# Patient Record
Sex: FEMALE | Race: WHITE | Employment: UNEMPLOYED | URBAN - METROPOLITAN AREA
[De-identification: names, ages, dates, MRNs, and addresses within clinical notes are randomized per-mention and may not be internally consistent; named-entity substitution may affect disease eponyms.]

---

## 2017-03-09 ENCOUNTER — OFFICE VISIT (OUTPATIENT)
Dept: ORTHOPEDIC SURGERY | Age: 37
End: 2017-03-09

## 2017-03-09 VITALS — DIASTOLIC BLOOD PRESSURE: 80 MMHG | SYSTOLIC BLOOD PRESSURE: 117 MMHG | WEIGHT: 218 LBS | HEART RATE: 60 BPM

## 2017-03-09 DIAGNOSIS — M23.200 OLD TEAR OF LATERAL MENISCUS OF RIGHT KNEE, UNSPECIFIED TEAR TYPE: ICD-10-CM

## 2017-03-09 DIAGNOSIS — M25.561 RIGHT KNEE PAIN, UNSPECIFIED CHRONICITY: Primary | ICD-10-CM

## 2017-03-09 PROCEDURE — 73564 X-RAY EXAM KNEE 4 OR MORE: CPT | Performed by: PHYSICIAN ASSISTANT

## 2017-03-09 PROCEDURE — 99203 OFFICE O/P NEW LOW 30 MIN: CPT | Performed by: PHYSICIAN ASSISTANT

## 2017-03-09 RX ORDER — IBUPROFEN 200 MG
200 TABLET ORAL EVERY 6 HOURS PRN
COMMUNITY
End: 2017-08-03

## 2017-03-09 RX ORDER — MELOXICAM 15 MG/1
15 TABLET ORAL DAILY
Qty: 30 TABLET | Refills: 0 | Status: SHIPPED | OUTPATIENT
Start: 2017-03-09 | End: 2017-08-03

## 2017-03-09 RX ORDER — NAPROXEN 250 MG/1
250 TABLET ORAL 2 TIMES DAILY WITH MEALS
COMMUNITY
End: 2017-04-28

## 2017-03-10 ENCOUNTER — TELEPHONE (OUTPATIENT)
Dept: ORTHOPEDIC SURGERY | Age: 37
End: 2017-03-10

## 2017-03-14 ENCOUNTER — HOSPITAL ENCOUNTER (OUTPATIENT)
Dept: MRI IMAGING | Age: 37
Discharge: HOME | End: 2017-03-14
Attending: PHYSICIAN ASSISTANT | Admitting: PHYSICIAN ASSISTANT

## 2017-03-14 DIAGNOSIS — M23.200 OLD TEAR OF LATERAL MENISCUS OF RIGHT KNEE, UNSPECIFIED TEAR TYPE: ICD-10-CM

## 2017-03-14 DIAGNOSIS — M25.561 RIGHT KNEE PAIN, UNSPECIFIED CHRONICITY: ICD-10-CM

## 2017-03-14 DIAGNOSIS — M25.561 PAIN IN RIGHT KNEE: ICD-10-CM

## 2017-04-20 ENCOUNTER — OFFICE VISIT (OUTPATIENT)
Dept: ORTHOPEDIC SURGERY | Age: 37
End: 2017-04-20

## 2017-04-20 DIAGNOSIS — M23.200 OLD TEAR OF LATERAL MENISCUS OF RIGHT KNEE, UNSPECIFIED TEAR TYPE: Primary | ICD-10-CM

## 2017-04-20 PROCEDURE — L1810 KO ELASTIC WITH JOINTS: HCPCS | Performed by: ORTHOPAEDIC SURGERY

## 2017-04-20 PROCEDURE — 99214 OFFICE O/P EST MOD 30 MIN: CPT | Performed by: ORTHOPAEDIC SURGERY

## 2017-04-26 ENCOUNTER — TELEPHONE (OUTPATIENT)
Dept: ORTHOPEDIC SURGERY | Age: 37
End: 2017-04-26

## 2017-05-12 ENCOUNTER — HOSPITAL ENCOUNTER (OUTPATIENT)
Dept: SURGERY | Age: 37
Discharge: HOME | End: 2017-05-12
Attending: ORTHOPAEDIC SURGERY | Admitting: ORTHOPAEDIC SURGERY

## 2017-05-12 VITALS
HEART RATE: 52 BPM | OXYGEN SATURATION: 98 % | HEIGHT: 71 IN | SYSTOLIC BLOOD PRESSURE: 110 MMHG | WEIGHT: 220 LBS | BODY MASS INDEX: 30.8 KG/M2 | TEMPERATURE: 97 F | DIASTOLIC BLOOD PRESSURE: 70 MMHG | RESPIRATION RATE: 15 BRPM

## 2017-05-12 RX ORDER — OXYCODONE HYDROCHLORIDE AND ACETAMINOPHEN 5; 325 MG/1; MG/1
1 TABLET ORAL PRN
Status: ACTIVE | OUTPATIENT
Start: 2017-05-12 | End: 2017-05-12

## 2017-05-12 RX ORDER — SODIUM CHLORIDE, SODIUM LACTATE, POTASSIUM CHLORIDE, CALCIUM CHLORIDE 600; 310; 30; 20 MG/100ML; MG/100ML; MG/100ML; MG/100ML
INJECTION, SOLUTION INTRAVENOUS CONTINUOUS
Status: DISCONTINUED | OUTPATIENT
Start: 2017-05-12 | End: 2017-05-13 | Stop reason: HOSPADM

## 2017-05-12 RX ORDER — LABETALOL HYDROCHLORIDE 5 MG/ML
5 INJECTION, SOLUTION INTRAVENOUS EVERY 10 MIN PRN
Status: DISCONTINUED | OUTPATIENT
Start: 2017-05-12 | End: 2017-05-13 | Stop reason: HOSPADM

## 2017-05-12 RX ORDER — ONDANSETRON 2 MG/ML
4 INJECTION INTRAMUSCULAR; INTRAVENOUS EVERY 10 MIN PRN
Status: DISCONTINUED | OUTPATIENT
Start: 2017-05-12 | End: 2017-05-13 | Stop reason: HOSPADM

## 2017-05-12 RX ORDER — HYDROCODONE BITARTRATE AND ACETAMINOPHEN 7.5; 325 MG/1; MG/1
1 TABLET ORAL EVERY 4 HOURS PRN
Qty: 40 TABLET | Refills: 0 | Status: SHIPPED | OUTPATIENT
Start: 2017-05-12 | End: 2017-05-19

## 2017-05-12 RX ORDER — OXYCODONE HYDROCHLORIDE AND ACETAMINOPHEN 5; 325 MG/1; MG/1
2 TABLET ORAL PRN
Status: ACTIVE | OUTPATIENT
Start: 2017-05-12 | End: 2017-05-12

## 2017-05-12 RX ORDER — HYDROMORPHONE HCL 110MG/55ML
0.5 PATIENT CONTROLLED ANALGESIA SYRINGE INTRAVENOUS EVERY 5 MIN PRN
Status: DISCONTINUED | OUTPATIENT
Start: 2017-05-12 | End: 2017-05-13 | Stop reason: HOSPADM

## 2017-05-12 RX ORDER — MEPERIDINE HYDROCHLORIDE 25 MG/ML
12.5 INJECTION INTRAMUSCULAR; INTRAVENOUS; SUBCUTANEOUS EVERY 5 MIN PRN
Status: DISCONTINUED | OUTPATIENT
Start: 2017-05-12 | End: 2017-05-13 | Stop reason: HOSPADM

## 2017-05-12 RX ORDER — MIDAZOLAM HYDROCHLORIDE 1 MG/ML
INJECTION INTRAMUSCULAR; INTRAVENOUS
Status: DISPENSED
Start: 2017-05-12 | End: 2017-05-12

## 2017-05-12 RX ORDER — HYDROMORPHONE HCL 110MG/55ML
0.25 PATIENT CONTROLLED ANALGESIA SYRINGE INTRAVENOUS EVERY 5 MIN PRN
Status: DISCONTINUED | OUTPATIENT
Start: 2017-05-12 | End: 2017-05-13 | Stop reason: HOSPADM

## 2017-05-12 RX ORDER — MIDAZOLAM HYDROCHLORIDE 1 MG/ML
2 INJECTION INTRAMUSCULAR; INTRAVENOUS ONCE
Status: COMPLETED | OUTPATIENT
Start: 2017-05-12 | End: 2017-05-12

## 2017-05-12 RX ORDER — LIDOCAINE HYDROCHLORIDE 10 MG/ML
1 INJECTION, SOLUTION EPIDURAL; INFILTRATION; INTRACAUDAL; PERINEURAL
Status: COMPLETED | OUTPATIENT
Start: 2017-05-12 | End: 2017-05-12

## 2017-05-12 RX ORDER — ACETAMINOPHEN 10 MG/ML
1000 INJECTION, SOLUTION INTRAVENOUS ONCE
Status: COMPLETED | OUTPATIENT
Start: 2017-05-12 | End: 2017-05-12

## 2017-05-12 RX ORDER — HYDRALAZINE HYDROCHLORIDE 20 MG/ML
5 INJECTION INTRAMUSCULAR; INTRAVENOUS EVERY 10 MIN PRN
Status: DISCONTINUED | OUTPATIENT
Start: 2017-05-12 | End: 2017-05-13 | Stop reason: HOSPADM

## 2017-05-12 RX ORDER — PROMETHAZINE HYDROCHLORIDE 25 MG/1
25 TABLET ORAL EVERY 6 HOURS PRN
Qty: 28 TABLET | Refills: 0 | Status: SHIPPED | OUTPATIENT
Start: 2017-05-12 | End: 2017-05-19

## 2017-05-12 RX ADMIN — MIDAZOLAM HYDROCHLORIDE 2 MG: 1 INJECTION INTRAMUSCULAR; INTRAVENOUS at 07:27

## 2017-05-12 RX ADMIN — LIDOCAINE HYDROCHLORIDE 1 ML: 10 INJECTION, SOLUTION EPIDURAL; INFILTRATION; INTRACAUDAL; PERINEURAL at 06:54

## 2017-05-12 RX ADMIN — Medication 0.5 MG: at 08:55

## 2017-05-12 RX ADMIN — SODIUM CHLORIDE, SODIUM LACTATE, POTASSIUM CHLORIDE, CALCIUM CHLORIDE: 600; 310; 30; 20 INJECTION, SOLUTION INTRAVENOUS at 06:55

## 2017-05-12 RX ADMIN — ACETAMINOPHEN 1000 MG: 10 INJECTION, SOLUTION INTRAVENOUS at 07:13

## 2017-05-12 RX ADMIN — Medication 0.5 MG: at 08:46

## 2017-05-12 ASSESSMENT — PAIN SCALES - GENERAL
PAINLEVEL_OUTOF10: 6
PAINLEVEL_OUTOF10: 7
PAINLEVEL_OUTOF10: 0
PAINLEVEL_OUTOF10: 4
PAINLEVEL_OUTOF10: 0
PAINLEVEL_OUTOF10: 7
PAINLEVEL_OUTOF10: 4

## 2017-05-12 ASSESSMENT — PAIN DESCRIPTION - DESCRIPTORS: DESCRIPTORS: ACHING

## 2017-05-12 ASSESSMENT — PAIN - FUNCTIONAL ASSESSMENT: PAIN_FUNCTIONAL_ASSESSMENT: 0-10

## 2017-05-12 ASSESSMENT — ACTIVITIES OF DAILY LIVING (ADL): EFFECT OF PAIN ON DAILY ACTIVITIES: WALKING AND STANDING

## 2017-05-18 ENCOUNTER — OFFICE VISIT (OUTPATIENT)
Dept: ORTHOPEDIC SURGERY | Age: 37
End: 2017-05-18

## 2017-05-18 DIAGNOSIS — M23.300 DEGENERATIVE TEAR OF LATERAL MENISCUS, RIGHT: Primary | ICD-10-CM

## 2017-05-18 PROCEDURE — 99024 POSTOP FOLLOW-UP VISIT: CPT | Performed by: ORTHOPAEDIC SURGERY

## 2017-06-15 ENCOUNTER — OFFICE VISIT (OUTPATIENT)
Dept: ORTHOPEDIC SURGERY | Age: 37
End: 2017-06-15

## 2017-06-15 VITALS — HEIGHT: 71 IN | WEIGHT: 220.02 LBS | BODY MASS INDEX: 30.8 KG/M2

## 2017-06-15 DIAGNOSIS — M17.11 PRIMARY OSTEOARTHRITIS OF RIGHT KNEE: Primary | ICD-10-CM

## 2017-06-15 DIAGNOSIS — M25.561 RIGHT KNEE PAIN, UNSPECIFIED CHRONICITY: ICD-10-CM

## 2017-06-15 DIAGNOSIS — M23.200 OLD COMPLEX TEAR OF LATERAL MENISCUS OF RIGHT KNEE: ICD-10-CM

## 2017-06-15 PROCEDURE — 99024 POSTOP FOLLOW-UP VISIT: CPT | Performed by: ORTHOPAEDIC SURGERY

## 2017-06-15 PROCEDURE — 73560 X-RAY EXAM OF KNEE 1 OR 2: CPT | Performed by: ORTHOPAEDIC SURGERY

## 2017-06-27 ENCOUNTER — OFFICE VISIT (OUTPATIENT)
Dept: ORTHOPEDIC SURGERY | Age: 37
End: 2017-06-27

## 2017-06-27 VITALS — BODY MASS INDEX: 30.38 KG/M2 | HEIGHT: 71 IN | WEIGHT: 217 LBS

## 2017-06-27 DIAGNOSIS — M17.11 PRIMARY OSTEOARTHRITIS OF RIGHT KNEE: Primary | ICD-10-CM

## 2017-06-27 PROCEDURE — 99024 POSTOP FOLLOW-UP VISIT: CPT | Performed by: ORTHOPAEDIC SURGERY

## 2017-06-27 PROCEDURE — 73560 X-RAY EXAM OF KNEE 1 OR 2: CPT | Performed by: ORTHOPAEDIC SURGERY

## 2017-07-05 DIAGNOSIS — M17.11 PRIMARY OSTEOARTHRITIS OF RIGHT KNEE: Primary | ICD-10-CM

## 2017-07-13 ENCOUNTER — HOSPITAL ENCOUNTER (OUTPATIENT)
Dept: CT IMAGING | Age: 37
Discharge: HOME | End: 2017-07-13
Attending: ORTHOPAEDIC SURGERY | Admitting: ORTHOPAEDIC SURGERY

## 2017-07-13 DIAGNOSIS — M17.11 PRIMARY OSTEOARTHRITIS OF RIGHT KNEE: ICD-10-CM

## 2017-08-01 ENCOUNTER — TELEPHONE (OUTPATIENT)
Dept: ORTHOPEDIC SURGERY | Age: 37
End: 2017-08-01

## 2017-08-01 ENCOUNTER — HOSPITAL ENCOUNTER (OUTPATIENT)
Dept: OTHER | Age: 37
Discharge: HOME | End: 2017-08-01
Attending: ORTHOPAEDIC SURGERY | Admitting: ORTHOPAEDIC SURGERY

## 2017-08-01 LAB
ABO/RH: NORMAL
ALBUMIN SERPL-MCNC: 4.1 G/DL (ref 3.4–5)
ANION GAP SERPL CALCULATED.3IONS-SCNC: 17 MMOL/L (ref 3–16)
ANTIBODY SCREEN: NORMAL
APTT: 31.6 SEC (ref 24.1–34.9)
BACTERIA: ABNORMAL /HPF
BASOPHILS ABSOLUTE: 0.1 K/UL (ref 0–0.2)
BASOPHILS RELATIVE PERCENT: 0.8 %
BILIRUBIN URINE: NEGATIVE
BLOOD, URINE: ABNORMAL
BUN BLDV-MCNC: 8 MG/DL (ref 7–20)
CALCIUM SERPL-MCNC: 9.3 MG/DL (ref 8.3–10.6)
CHLORIDE BLD-SCNC: 99 MMOL/L (ref 99–110)
CLARITY: CLEAR
CO2: 22 MMOL/L (ref 21–32)
COLOR: YELLOW
CREAT SERPL-MCNC: 0.9 MG/DL (ref 0.6–1.1)
EOSINOPHILS ABSOLUTE: 0.2 K/UL (ref 0–0.6)
EOSINOPHILS RELATIVE PERCENT: 2.7 %
EPITHELIAL CELLS, UA: ABNORMAL /HPF
GFR AFRICAN AMERICAN: >60
GFR NON-AFRICAN AMERICAN: >60
GLUCOSE BLD-MCNC: 89 MG/DL (ref 70–99)
GLUCOSE URINE: NEGATIVE MG/DL
HCT VFR BLD CALC: 39.7 % (ref 36–48)
HEMOGLOBIN: 13.3 G/DL (ref 12–16)
INR BLD: 1.04 (ref 0.85–1.15)
KETONES, URINE: NEGATIVE MG/DL
LEUKOCYTE ESTERASE, URINE: NEGATIVE
LYMPHOCYTES ABSOLUTE: 1.9 K/UL (ref 1–5.1)
LYMPHOCYTES RELATIVE PERCENT: 22.3 %
MCH RBC QN AUTO: 28 PG (ref 26–34)
MCHC RBC AUTO-ENTMCNC: 33.5 G/DL (ref 31–36)
MCV RBC AUTO: 83.4 FL (ref 80–100)
MICROSCOPIC EXAMINATION: YES
MONOCYTES ABSOLUTE: 0.4 K/UL (ref 0–1.3)
MONOCYTES RELATIVE PERCENT: 5.3 %
NEUTROPHILS ABSOLUTE: 5.8 K/UL (ref 1.7–7.7)
NEUTROPHILS RELATIVE PERCENT: 68.9 %
NITRITE, URINE: NEGATIVE
PDW BLD-RTO: 15 % (ref 12.4–15.4)
PH UA: 5.5
PLATELET # BLD: 216 K/UL (ref 135–450)
PMV BLD AUTO: 10 FL (ref 5–10.5)
POTASSIUM SERPL-SCNC: 4.2 MMOL/L (ref 3.5–5.1)
PROTEIN UA: NEGATIVE MG/DL
PROTHROMBIN TIME: 11.7 SEC (ref 9.6–13)
RBC # BLD: 4.76 M/UL (ref 4–5.2)
RBC UA: ABNORMAL /HPF (ref 0–2)
SODIUM BLD-SCNC: 138 MMOL/L (ref 136–145)
SPECIFIC GRAVITY UA: 1.01
TRANSFERRIN: 269 MG/DL (ref 200–360)
URINE TYPE: ABNORMAL
UROBILINOGEN, URINE: 0.2 E.U./DL
WBC # BLD: 8.5 K/UL (ref 4–11)
WBC UA: ABNORMAL /HPF (ref 0–5)

## 2017-08-03 LAB — URINE CULTURE, ROUTINE: NORMAL

## 2017-08-14 DIAGNOSIS — M17.11 PRIMARY OSTEOARTHRITIS OF RIGHT KNEE: Primary | ICD-10-CM

## 2017-08-17 DIAGNOSIS — M17.11 PRIMARY OSTEOARTHRITIS OF RIGHT KNEE: Primary | ICD-10-CM

## 2017-08-17 RX ORDER — FLUCONAZOLE 100 MG/1
100 TABLET ORAL DAILY
Qty: 1 TABLET | Refills: 0 | Status: SHIPPED | OUTPATIENT
Start: 2017-08-17 | End: 2017-08-18

## 2017-08-21 RX ORDER — OXYCODONE HYDROCHLORIDE AND ACETAMINOPHEN 5; 325 MG/1; MG/1
1 TABLET ORAL EVERY 6 HOURS PRN
Qty: 40 TABLET | Refills: 0 | Status: SHIPPED | OUTPATIENT
Start: 2017-08-21 | End: 2017-08-28

## 2017-08-22 DIAGNOSIS — M23.200 OLD COMPLEX TEAR OF LATERAL MENISCUS OF RIGHT KNEE: Primary | ICD-10-CM

## 2017-08-22 RX ORDER — OXYCODONE HYDROCHLORIDE 5 MG/1
5 TABLET ORAL EVERY 6 HOURS PRN
Qty: 40 TABLET | Refills: 0 | Status: SHIPPED | OUTPATIENT
Start: 2017-08-22 | End: 2017-09-01

## 2017-08-29 ENCOUNTER — OFFICE VISIT (OUTPATIENT)
Dept: ORTHOPEDIC SURGERY | Age: 37
End: 2017-08-29

## 2017-08-29 VITALS — WEIGHT: 205.03 LBS | BODY MASS INDEX: 29.35 KG/M2 | HEIGHT: 70 IN

## 2017-08-29 DIAGNOSIS — Z96.651 STATUS POST RIGHT PARTIAL KNEE REPLACEMENT: Primary | ICD-10-CM

## 2017-08-29 DIAGNOSIS — M25.561 RIGHT KNEE PAIN, UNSPECIFIED CHRONICITY: ICD-10-CM

## 2017-08-29 PROCEDURE — 73560 X-RAY EXAM OF KNEE 1 OR 2: CPT | Performed by: ORTHOPAEDIC SURGERY

## 2017-08-29 PROCEDURE — 99024 POSTOP FOLLOW-UP VISIT: CPT | Performed by: ORTHOPAEDIC SURGERY

## 2017-08-29 RX ORDER — OXYCODONE HYDROCHLORIDE 5 MG/1
5 TABLET ORAL EVERY 6 HOURS PRN
Qty: 28 TABLET | Refills: 0 | Status: SHIPPED | OUTPATIENT
Start: 2017-08-29 | End: 2017-09-05

## 2017-08-30 RX ORDER — PROMETHAZINE HYDROCHLORIDE 25 MG/1
TABLET ORAL
Qty: 10 TABLET | Refills: 0 | Status: SHIPPED | OUTPATIENT
Start: 2017-08-30

## 2017-09-26 ENCOUNTER — OFFICE VISIT (OUTPATIENT)
Dept: ORTHOPEDIC SURGERY | Age: 37
End: 2017-09-26

## 2017-09-26 DIAGNOSIS — M17.11 PRIMARY OSTEOARTHRITIS OF RIGHT KNEE: Primary | ICD-10-CM

## 2017-09-26 DIAGNOSIS — Z96.651 STATUS POST RIGHT PARTIAL KNEE REPLACEMENT: Primary | ICD-10-CM

## 2017-09-26 PROCEDURE — 99024 POSTOP FOLLOW-UP VISIT: CPT | Performed by: ORTHOPAEDIC SURGERY

## 2017-11-21 ENCOUNTER — OFFICE VISIT (OUTPATIENT)
Dept: ORTHOPEDIC SURGERY | Age: 37
End: 2017-11-21

## 2017-11-21 VITALS — HEIGHT: 71 IN | WEIGHT: 200 LBS | BODY MASS INDEX: 28 KG/M2

## 2017-11-21 DIAGNOSIS — M25.561 RIGHT KNEE PAIN, UNSPECIFIED CHRONICITY: ICD-10-CM

## 2017-11-21 DIAGNOSIS — Z96.651 STATUS POST RIGHT PARTIAL KNEE REPLACEMENT: Primary | ICD-10-CM

## 2017-11-21 PROCEDURE — 73560 X-RAY EXAM OF KNEE 1 OR 2: CPT | Performed by: ORTHOPAEDIC SURGERY

## 2017-11-21 PROCEDURE — G8417 CALC BMI ABV UP PARAM F/U: HCPCS | Performed by: ORTHOPAEDIC SURGERY

## 2017-11-21 PROCEDURE — G8427 DOCREV CUR MEDS BY ELIG CLIN: HCPCS | Performed by: ORTHOPAEDIC SURGERY

## 2017-11-21 PROCEDURE — 99213 OFFICE O/P EST LOW 20 MIN: CPT | Performed by: ORTHOPAEDIC SURGERY

## 2017-11-21 PROCEDURE — 4004F PT TOBACCO SCREEN RCVD TLK: CPT | Performed by: ORTHOPAEDIC SURGERY

## 2017-11-21 PROCEDURE — G8484 FLU IMMUNIZE NO ADMIN: HCPCS | Performed by: ORTHOPAEDIC SURGERY

## 2017-11-21 NOTE — PROGRESS NOTES
Chief Complaint  Follow-up (sx 8-11-17 partial knee replacement)    right knee status post Lateral unicompartmental knee arthroplasty. [facility])    Date of surgery: August 11, 2017    History of Present Illness:  Crystal Angulo is a 42 y.o. female here 3 months status post lateral compartment arthroplasty of the right knee, she has 0 out of 10 pain, she has resumed all of her activities of daily living, she has completed physical therapy. She is very pleased with her progress and has no complaints at this time. Vital Signs: There were no vitals filed for this visit. Pain Assessment  Location of Pain: Knee  Location Modifiers: Right  Severity of Pain: 0]      Knee Examination:  Inspection:  Incision is well-healed there is no erythema or drainage. Palpation:  No tenderness to palpation throughout the knee. No effusion    Range of Motion:  0-130°    Strength: Able to do a straight leg raise    Special Tests:  Stable to varus and valgus stress, anterior-posterior drawer    Skin: There are no rashes, ulcerations or lesions. Gait: Ambulating without any assistive devices      Assessment :  right knee status post Lateral unicompartmental knee arthroplasty. Impression:  Encounter Diagnoses   Name Primary?  Status post right partial knee replacement Yes    Right knee pain, unspecified chronicity        Office Procedures:  Orders Placed This Encounter   Procedures    XR KNEE RIGHT (1-2 VIEWS)       Treatment Plan:    She'll continue to progress all activities as tolerated, she has no restrictions at this time, she will follow up every August for annual visit, AP and lateral x-ray of the right knee. Patient agrees with this plan, all of their questions were answered best of our ability and to their satisfaction. This dictation was performed with a verbal recognition program (DRAGON) and it was checked for errors.  It is possible that there are still dictated errors within this office note. If so, please bring any errors to my attention for an addendum. All efforts were made to ensure that this office note is accurate.

## 2019-02-05 ENCOUNTER — OFFICE VISIT (OUTPATIENT)
Dept: ORTHOPEDIC SURGERY | Age: 39
End: 2019-02-05
Payer: COMMERCIAL

## 2019-02-05 VITALS — HEIGHT: 71 IN | BODY MASS INDEX: 27.99 KG/M2 | WEIGHT: 199.96 LBS

## 2019-02-05 DIAGNOSIS — M75.41 SUBACROMIAL IMPINGEMENT OF RIGHT SHOULDER: ICD-10-CM

## 2019-02-05 DIAGNOSIS — G25.89 SCAPULAR DYSKINESIS: ICD-10-CM

## 2019-02-05 DIAGNOSIS — M75.81 ROTATOR CUFF TENDONITIS, RIGHT: ICD-10-CM

## 2019-02-05 DIAGNOSIS — Z96.651 STATUS POST RIGHT PARTIAL KNEE REPLACEMENT: ICD-10-CM

## 2019-02-05 DIAGNOSIS — R52 PAIN: Primary | ICD-10-CM

## 2019-02-05 PROCEDURE — 4004F PT TOBACCO SCREEN RCVD TLK: CPT | Performed by: ORTHOPAEDIC SURGERY

## 2019-02-05 PROCEDURE — G8419 CALC BMI OUT NRM PARAM NOF/U: HCPCS | Performed by: ORTHOPAEDIC SURGERY

## 2019-02-05 PROCEDURE — G8484 FLU IMMUNIZE NO ADMIN: HCPCS | Performed by: ORTHOPAEDIC SURGERY

## 2019-02-05 PROCEDURE — 99213 OFFICE O/P EST LOW 20 MIN: CPT | Performed by: ORTHOPAEDIC SURGERY

## 2019-02-05 PROCEDURE — G8427 DOCREV CUR MEDS BY ELIG CLIN: HCPCS | Performed by: ORTHOPAEDIC SURGERY

## 2019-02-13 DIAGNOSIS — M75.41 SUBACROMIAL IMPINGEMENT OF RIGHT SHOULDER: Primary | ICD-10-CM

## 2019-04-09 ENCOUNTER — OFFICE VISIT (OUTPATIENT)
Dept: ORTHOPEDIC SURGERY | Age: 39
End: 2019-04-09
Payer: COMMERCIAL

## 2019-04-09 VITALS — HEIGHT: 71 IN | WEIGHT: 199.96 LBS | BODY MASS INDEX: 27.99 KG/M2

## 2019-04-09 DIAGNOSIS — M75.41 SUBACROMIAL IMPINGEMENT OF RIGHT SHOULDER: ICD-10-CM

## 2019-04-09 DIAGNOSIS — M75.81 ROTATOR CUFF TENDONITIS, RIGHT: Primary | ICD-10-CM

## 2019-04-09 DIAGNOSIS — G25.89 SCAPULAR DYSKINESIS: ICD-10-CM

## 2019-04-09 PROCEDURE — 99213 OFFICE O/P EST LOW 20 MIN: CPT | Performed by: ORTHOPAEDIC SURGERY

## 2019-04-09 PROCEDURE — 4004F PT TOBACCO SCREEN RCVD TLK: CPT | Performed by: ORTHOPAEDIC SURGERY

## 2019-04-09 PROCEDURE — G8427 DOCREV CUR MEDS BY ELIG CLIN: HCPCS | Performed by: ORTHOPAEDIC SURGERY

## 2019-04-09 PROCEDURE — G8419 CALC BMI OUT NRM PARAM NOF/U: HCPCS | Performed by: ORTHOPAEDIC SURGERY

## 2019-04-09 NOTE — PROGRESS NOTES
Chief Complaint:  Shoulder Pain (CK R SHOULDER)      SUBJECTIVE:  Tangela Brian is a 34 y.o. female who returns today for reevaluation of right shoulder, she has completed 4-6 weeks of physical therapy without improvement of symptoms. She is unable to tolerate proximal and. She continues to have 4 out of 10 discomfort deep within the shoulder. Pain Assessment:  Pain Assessment  Location of Pain: Shoulder  Location Modifiers: Right  Severity of Pain: 4  Result of Injury: No  Work-Related Injury: No  Are there other pain locations you wish to document?: No      Medical History:  Patient's medications, allergies, past medical, surgical, social and family histories were reviewed and updated as appropriate. Review of Systems:  Constitutional: negative  Respiratory: negative  Cardiovascular: negative  Musculoskeletal:negative except for Shoulder Pain (CK R SHOULDER)    Relevant review of systems reviewed and available in the patient's chart in media tab    General Exam:   Constitutional: Patient is adequately groomed with no evidence of malnutrition  Mental Status: The patient is oriented to time, place and person. The patient's mood and affect are appropriate. Vascular: Examination reveals no swelling or calf tenderness. Peripheral pulses are palpable and 2+. OBJECTIVE:  Vital Signs:  Vitals:    04/09/19 1123   Weight: 199 lb 15.3 oz (90.7 kg)   Height: 5' 10.98\" (1.803 m)       Appearance: alert, well appearing, and in no distress, oriented to person, place, and time and normal appearing weight. Physical exam:   Physical exam of the right shoulder continues to show full forward flexion 180°, full and symmetric functional internal and external rotation. She continues to have 4+/5/5 strength with supraspinatus testing although she does have some discomfort. She has no pain with infraspinatus or subscapularis testing.       Assessment :  Right shoulder rotator cuff tendinitis and subacromial impingement no improvement with conservative treatment    Impression:  Encounter Diagnoses   Name Primary?  Rotator cuff tendonitis, right Yes    Subacromial impingement of right shoulder     Scapular dyskinesis        Office Procedures:  Orders Placed This Encounter   Procedures    MRI SHOULDER RIGHT WO CONTRAST     Standing Status:   Future     Standing Expiration Date:   4/9/2020     Scheduling Instructions:      [facility]            OPEN MRI            ANYDAY/ANYTIME     Order Specific Question:   Reason for exam:     Answer:   R/O ROTATOR CUFF TEAR     No orders of the defined types were placed in this encounter. Treatment Plan:  Based on patient's history and physical exam I'm concerned about rotator cuff tear therefore I would like to obtain an MRI to further evaluate. Once the MRI is obtained the patient will follow up with me for further evaluation and discussion. Patient agrees with this plan, all of their questions were answered best of our ability and to their satisfaction.           Ramya Ford

## 2019-04-10 ENCOUNTER — TELEPHONE (OUTPATIENT)
Dept: ORTHOPEDIC SURGERY | Age: 39
End: 2019-04-10

## 2019-04-10 NOTE — TELEPHONE ENCOUNTER
Called PT and gave her MRI time/date, 4/17/19 @ 10:45AM NIDHI VEGA.  Also scheduled F/U sourav for 4/23/19 @ 11:10AM FRANCINE

## 2019-04-26 ENCOUNTER — HOSPITAL ENCOUNTER (OUTPATIENT)
Dept: MRI IMAGING | Age: 39
Discharge: HOME | End: 2019-04-26
Payer: COMMERCIAL

## 2019-04-26 DIAGNOSIS — M75.81 ROTATOR CUFF TENDONITIS, RIGHT: ICD-10-CM

## 2019-04-26 DIAGNOSIS — M75.41 SUBACROMIAL IMPINGEMENT OF RIGHT SHOULDER: ICD-10-CM

## 2019-04-26 DIAGNOSIS — G25.89 SCAPULAR DYSKINESIS: ICD-10-CM

## 2019-04-26 PROCEDURE — 73221 MRI JOINT UPR EXTREM W/O DYE: CPT

## 2019-04-30 ENCOUNTER — OFFICE VISIT (OUTPATIENT)
Dept: ORTHOPEDIC SURGERY | Age: 39
End: 2019-04-30
Payer: COMMERCIAL

## 2019-04-30 VITALS — HEIGHT: 71 IN | BODY MASS INDEX: 27.99 KG/M2 | WEIGHT: 199.96 LBS

## 2019-04-30 DIAGNOSIS — M75.81 ROTATOR CUFF TENDONITIS, RIGHT: Primary | ICD-10-CM

## 2019-04-30 PROCEDURE — 4004F PT TOBACCO SCREEN RCVD TLK: CPT | Performed by: ORTHOPAEDIC SURGERY

## 2019-04-30 PROCEDURE — G8427 DOCREV CUR MEDS BY ELIG CLIN: HCPCS | Performed by: ORTHOPAEDIC SURGERY

## 2019-04-30 PROCEDURE — 99213 OFFICE O/P EST LOW 20 MIN: CPT | Performed by: ORTHOPAEDIC SURGERY

## 2019-04-30 PROCEDURE — G8419 CALC BMI OUT NRM PARAM NOF/U: HCPCS | Performed by: ORTHOPAEDIC SURGERY

## 2019-04-30 RX ORDER — AZITHROMYCIN 500 MG/1
TABLET, FILM COATED ORAL
Refills: 0 | COMMUNITY
Start: 2019-04-25 | End: 2020-06-24 | Stop reason: ALTCHOICE

## 2019-04-30 RX ORDER — AMITRIPTYLINE HYDROCHLORIDE 10 MG/1
TABLET, FILM COATED ORAL
Refills: 0 | COMMUNITY
Start: 2019-04-19

## 2019-04-30 RX ORDER — ONDANSETRON HYDROCHLORIDE 8 MG/1
1 TABLET, FILM COATED ORAL
COMMUNITY

## 2019-04-30 RX ORDER — PANTOPRAZOLE SODIUM 40 MG/1
TABLET, DELAYED RELEASE ORAL
Refills: 0 | COMMUNITY
Start: 2019-04-19

## 2019-04-30 NOTE — PROGRESS NOTES
partial-thickness tearing    Impression:  Encounter Diagnosis   Name Primary?  Rotator cuff tendonitis, right Yes       Office Procedures:  Orders Placed This Encounter   Procedures    External Referral To Physical Therapy     Referral Priority:   Routine     Referral Type:   Eval and Treat     Referral Reason:   Specialty Services Required     Requested Specialty:   Physical Therapy     Number of Visits Requested:   1     No orders of the defined types were placed in this encounter. Treatment Plan:  MRI results were reviewed, this point I recommend continued conservative treatment. We discussed possibly injections, physical therapy and topical creams. Patient elected to pursue a Voltaren cream continued physical therapy. We also discussed surgical treatment options including right shoulder video arthroscopy with rotator cuff debridement and placement of rotation medical graft, this point patient will continue conservative treatment. They will follow up in 2-3 months for further evaluation and discussion.       Rasahun Medina

## 2020-05-29 ENCOUNTER — OFFICE VISIT (OUTPATIENT)
Dept: ORTHOPEDIC SURGERY | Age: 40
End: 2020-05-29
Payer: COMMERCIAL

## 2020-05-29 VITALS — WEIGHT: 199.96 LBS | HEIGHT: 71 IN | BODY MASS INDEX: 27.99 KG/M2

## 2020-05-29 PROCEDURE — G8419 CALC BMI OUT NRM PARAM NOF/U: HCPCS | Performed by: PHYSICIAN ASSISTANT

## 2020-05-29 PROCEDURE — L1810 KO ELASTIC WITH JOINTS: HCPCS | Performed by: PHYSICIAN ASSISTANT

## 2020-05-29 PROCEDURE — G8427 DOCREV CUR MEDS BY ELIG CLIN: HCPCS | Performed by: PHYSICIAN ASSISTANT

## 2020-05-29 PROCEDURE — 99213 OFFICE O/P EST LOW 20 MIN: CPT | Performed by: PHYSICIAN ASSISTANT

## 2020-05-29 PROCEDURE — 4004F PT TOBACCO SCREEN RCVD TLK: CPT | Performed by: PHYSICIAN ASSISTANT

## 2020-05-29 NOTE — PROGRESS NOTES
is tenderness to palpation along the lateral joint line and minimally over the medial or patellofemoral joint. Range of Motion: -5 extension to 90 of active flexion. Strength:  4+/5 quadriceps and hamstrings    Special Tests: The knee is stable to varus valgus stressing/anterior posterior drawer. Negative Homans test.                                 Skin: There are no rashes, ulcerations or lesions. Gait: mildly antalgic favoring the left side    Reflex 2+ patellar    Examination of the right knee reveals intact skin with a well-healed anterior medial incision. There is no focal tenderness. The patient demonstrates full painless range of motion with regard to flexion and extension. Strength is 5/5 throughout all planes. Ligamentous stability is grossly intact. Examination of the left hip reveals intact skin. The patient demonstrates full painless range of motion with regards to flexion, abduction, internal and external rotation. There is no tenderness about the greater trochanter. There is a negative straight leg raise against resistance. Strength is 5/5 throughout all planes. Radiology:   X-rays obtained and reviewed in office:  Views 4 views including AP weightbearing, PA flexed weightbearing, lateral, and skyline  Location left knee: X-rays reveal grade 3/4 osteoarthritis noted within the lateral compartment of the left knee with no acute or subacute fractures noted. There is subchondral sclerosis noted over the tibial femoral joint. Impression:   Osteoarthritis lateral compartment left knee with probable left lateral meniscal tear    Impression:  Encounter Diagnoses   Name Primary?     Left knee pain, unspecified chronicity Yes    Arthritis of knee        Office Procedures:  Orders Placed This Encounter   Procedures    XR KNEE LEFT (MIN 4 VIEWS)     Standing Status:   Future     Number of Occurrences:   1     Standing Expiration Date:   5/29/2021     Order Specific Question:   Reason for exam:     Answer:   PAIN    Economy Hinged Knee Wrap Around     Patient was prescribed a Breg Economy Hinged Wrap Around Knee Brace. The left knee will require stabilization / immobilization from this semi-rigid / rigid orthosis to improve their function. The orthosis will assist in protecting the affected area, provide functional support and facilitate healing. The patient was educated and fit by a healthcare professional with expert knowledge and specialization in brace application while under the direct supervision of the treating physician. Verbal and written instructions for the use of and application of this item were provided. They were instructed to contact the office immediately should the brace result in increased pain, decreased sensation, increased swelling or worsening of the condition. Treatment Plan:  Patient was placed into an economy knee brace to be worn on the left knee during the day and with all her weightbearing activity. She is to take it off at rest and for sleeping. She is to continue with extra strength Tylenol and she will follow-up with Dr. Digna Turner in 1 to 2 weeks for her continue evaluation care.

## 2020-06-09 ENCOUNTER — HOSPITAL ENCOUNTER (OUTPATIENT)
Age: 40
Discharge: HOME | End: 2020-06-09
Payer: COMMERCIAL

## 2020-06-09 ENCOUNTER — OFFICE VISIT (OUTPATIENT)
Dept: ORTHOPEDIC SURGERY | Age: 40
End: 2020-06-09
Payer: COMMERCIAL

## 2020-06-09 VITALS — HEIGHT: 71 IN | BODY MASS INDEX: 27.99 KG/M2 | RESPIRATION RATE: 20 BRPM | WEIGHT: 199.96 LBS

## 2020-06-09 PROBLEM — M17.12 OSTEOARTHRITIS OF LEFT KNEE: Status: ACTIVE | Noted: 2020-06-09

## 2020-06-09 LAB
ALBUMIN SERPL-MCNC: 3.8 G/DL (ref 3.4–5)
ANION GAP SERPL CALCULATED.3IONS-SCNC: 11 MMOL/L (ref 3–16)
APTT: 27.1 SEC (ref 24.2–36.2)
BASOPHILS ABSOLUTE: 0.1 K/UL (ref 0–0.2)
BASOPHILS RELATIVE PERCENT: 1.1 %
BILIRUBIN URINE: NEGATIVE
BLOOD, URINE: NEGATIVE
BUN BLDV-MCNC: 9 MG/DL (ref 7–20)
CALCIUM SERPL-MCNC: 9 MG/DL (ref 8.3–10.6)
CHLORIDE BLD-SCNC: 101 MMOL/L (ref 99–110)
CLARITY: CLEAR
CO2: 26 MMOL/L (ref 21–32)
COLOR: NORMAL
CREAT SERPL-MCNC: 0.9 MG/DL (ref 0.6–1.1)
EOSINOPHILS ABSOLUTE: 0.3 K/UL (ref 0–0.6)
EOSINOPHILS RELATIVE PERCENT: 3.1 %
GFR AFRICAN AMERICAN: >60
GFR NON-AFRICAN AMERICAN: >60
GLUCOSE BLD-MCNC: 123 MG/DL (ref 70–99)
GLUCOSE URINE: NEGATIVE MG/DL
HCT VFR BLD CALC: 35.4 % (ref 36–48)
HEMOGLOBIN: 11.6 G/DL (ref 12–16)
INR BLD: 1.03 (ref 0.86–1.14)
KETONES, URINE: NEGATIVE MG/DL
LEUKOCYTE ESTERASE, URINE: NEGATIVE
LYMPHOCYTES ABSOLUTE: 2.3 K/UL (ref 1–5.1)
LYMPHOCYTES RELATIVE PERCENT: 27.1 %
MCH RBC QN AUTO: 27.2 PG (ref 26–34)
MCHC RBC AUTO-ENTMCNC: 32.9 G/DL (ref 31–36)
MCV RBC AUTO: 82.7 FL (ref 80–100)
MICROSCOPIC EXAMINATION: NORMAL
MONOCYTES ABSOLUTE: 0.9 K/UL (ref 0–1.3)
MONOCYTES RELATIVE PERCENT: 10.4 %
NEUTROPHILS ABSOLUTE: 4.9 K/UL (ref 1.7–7.7)
NEUTROPHILS RELATIVE PERCENT: 58.3 %
NITRITE, URINE: NEGATIVE
PDW BLD-RTO: 14.8 % (ref 12.4–15.4)
PH UA: 6 (ref 5–8)
PLATELET # BLD: 285 K/UL (ref 135–450)
PMV BLD AUTO: 8.6 FL (ref 5–10.5)
POTASSIUM SERPL-SCNC: 4 MMOL/L (ref 3.5–5.1)
PROTEIN UA: NEGATIVE MG/DL
PROTHROMBIN TIME: 11.9 SEC (ref 10–13.2)
RBC # BLD: 4.28 M/UL (ref 4–5.2)
SODIUM BLD-SCNC: 138 MMOL/L (ref 136–145)
SPECIFIC GRAVITY UA: <=1.005 (ref 1–1.03)
URINE TYPE: NORMAL
UROBILINOGEN, URINE: 0.2 E.U./DL
WBC # BLD: 8.4 K/UL (ref 4–11)

## 2020-06-09 PROCEDURE — 87086 URINE CULTURE/COLONY COUNT: CPT

## 2020-06-09 PROCEDURE — 85730 THROMBOPLASTIN TIME PARTIAL: CPT

## 2020-06-09 PROCEDURE — 4004F PT TOBACCO SCREEN RCVD TLK: CPT | Performed by: ORTHOPAEDIC SURGERY

## 2020-06-09 PROCEDURE — 83036 HEMOGLOBIN GLYCOSYLATED A1C: CPT

## 2020-06-09 PROCEDURE — 99214 OFFICE O/P EST MOD 30 MIN: CPT | Performed by: ORTHOPAEDIC SURGERY

## 2020-06-09 PROCEDURE — 80048 BASIC METABOLIC PNL TOTAL CA: CPT

## 2020-06-09 PROCEDURE — 36415 COLL VENOUS BLD VENIPUNCTURE: CPT

## 2020-06-09 PROCEDURE — 82040 ASSAY OF SERUM ALBUMIN: CPT

## 2020-06-09 PROCEDURE — 81003 URINALYSIS AUTO W/O SCOPE: CPT

## 2020-06-09 PROCEDURE — 85025 COMPLETE CBC W/AUTO DIFF WBC: CPT

## 2020-06-09 PROCEDURE — G8427 DOCREV CUR MEDS BY ELIG CLIN: HCPCS | Performed by: ORTHOPAEDIC SURGERY

## 2020-06-09 PROCEDURE — 85610 PROTHROMBIN TIME: CPT

## 2020-06-09 PROCEDURE — 84466 ASSAY OF TRANSFERRIN: CPT

## 2020-06-09 PROCEDURE — G8419 CALC BMI OUT NRM PARAM NOF/U: HCPCS | Performed by: ORTHOPAEDIC SURGERY

## 2020-06-09 NOTE — LETTER
Attention    These are medical screening labs, not pre-admission surgery labs. [address] [address].  [phone]  [place] [id number] [facility], [address] [facility]    Date:  2020    Name: Osmin Wong    : 1980    Please take this form with you.       THE FOLLOWING LABS NEED TO BE COMPLETED:    _x__UA  _x__URINE C/S (THIS NEEDS TO BE DONE EVEN IF THE UA IS NORMAL)  _x__CBC W/ DIFF  _x__PT/INR  _x__PTT  _x__TRANSFERRIN  _x__ALBUMIN  _x__CHEM 7  _x__HEMAGLOBIN A1-C  ____OTHER: _____________________________________________                         Diagnosis:  LEFT KNEE OSTEOARTHRITIS            RT KNEE OA M17.11      LT KNEE OA M17.12         RT HIP OA  M16.11         LT HIP OA M16.12         RT SHLD OA  M19.011   LT SHLD OA  M19.012             Z01.812  (Pre-op examination code)            Radha Anderson DO.      2020 1:28 PM

## 2020-06-09 NOTE — PROGRESS NOTES
Peripheral pulses are palpable and 2+. left Knee Examination  Inspection:   Valgus deformities noted. mild swelling noted. No erythema or ecchymosis. Skin is intact. Palpation: negative Tenderness to palpation along the medial joint line, positive Tenderness to palpation along lateral joint line, negative Tenderness to palpation along medial and lateral facets of undersurface of the patella    Range of Motion:  0-130    Strength:  Able to do SLR    Special Tests:  ACL, MCL, PCL, LCL are intact with stress exam.  There is a click in the kneecap when patient ranges from flexion to extension,negative crepitus noted with range of motion under the patella. A negative grind test.  positive Maxx's and Apley compression test.       Skin: There are no rashes, ulcerations or lesions. Gait: Slightly antalgic gait, no assistive devices    Reflex: not tested    Additional Examinations:  Right Lower Extremity: Examination of the right lower extremity does not show any tenderness, deformity or injury. Range of motion is unremarkable. There is no gross instability. There are no rashes, ulcerations or lesions. Strength and tone are normal.      LUMBAR SPINE: The skin is warm and dry. There is no swelling, warmth, or erythema. Range of motion is within normal limits. There is no paraspinal or spinous process tenderness. Ipsilateral and contralateral straight leg raising tests are negative. The distal neurovascular exam is grossly intact and symmetric. X-RAYS: 4 views weightbearing AP, lateral. PA and sunrise of the left knee were reviewed, they show no periosteal reaction, medullary lesions, or osteopenia. Severe lateral compartment joint space narrowing with valgus deformity on PA weightbearing view. Medial and patellofemoral joint spaces are well maintained. No evidence of fracture or dislocation.       Assessment : Left knee lateral compartment osteoarthritis    Impression:  Encounter Diagnoses   Name

## 2020-06-10 LAB
ESTIMATED AVERAGE GLUCOSE: 96.8 MG/DL
HBA1C MFR BLD: 5 %
TRANSFERRIN: 218 MG/DL (ref 200–360)

## 2020-06-11 ENCOUNTER — HOSPITAL ENCOUNTER (OUTPATIENT)
Dept: PHYSICAL THERAPY | Age: 40
Setting detail: THERAPIES SERIES
Discharge: HOME | End: 2020-06-11
Payer: COMMERCIAL

## 2020-06-11 LAB — URINE CULTURE, ROUTINE: NORMAL

## 2020-06-11 PROCEDURE — 97161 PT EVAL LOW COMPLEX 20 MIN: CPT | Performed by: PHYSICAL THERAPIST

## 2020-06-11 PROCEDURE — 97112 NEUROMUSCULAR REEDUCATION: CPT | Performed by: PHYSICAL THERAPIST

## 2020-06-11 PROCEDURE — 97110 THERAPEUTIC EXERCISES: CPT | Performed by: PHYSICAL THERAPIST

## 2020-06-11 NOTE — PLAN OF CARE
[address], [address] [facility] ([place]), Mehnaz Jimenez  Phone: [phone]  Fax [phone]     Physical Therapy Certification    Dear Referring Practitioner: Alban Corona,    We had the pleasure of evaluating the following patient for physical therapy services at [address] [place] [address]. A summary of our findings can be found in the initial assessment below. This includes our plan of care. If you have any questions or concerns regarding these findings, please do not hesitate to contact me at the office phone number checked above. Thank you for the referral.       Physician Signature:_______________________________Date:__________________  By signing above (or electronic signature), therapists plan is approved by physician    Patient: Holley Kohler   : 1980   MRN: [id number]  Referring Physician: Referring Practitioner: Alban Corona      Evaluation Date: 2020      Medical Diagnosis Information:  Diagnosis: Left knee pain / OA / Prehab   Treatment Diagnosis: M25.562                                         Insurance information: PT Insurance Information: [employer]     Precautions/ Contra-indications:     C-SSRS Triggered by Intake questionnaire (Past 2 wk assessment):   [x] No, Questionnaire did not trigger screening.   [] Yes, Patient intake triggered further evaluation      [] C-SSRS Screening completed  [] PCP notified via Plan of Care  [] Emergency services notified     Latex Allergy:  [x]NO      []YES  Preferred Language for Healthcare:   [x]English       []other:    SUBJECTIVE: Patient stated complaint:Prehab visit before surgery with Dr. Alban Corona. Plans to do a unicompartmental replacement on the left knee. Hx of Right knee uni back in 2017. Relevant Medical History:R knee uni in 2017.    Functional Disability Index:  LEFS: 75%    Pain Scale: 10/10  Easing factors: rest, ice, sitting  Provocative factors: walking, steps, squatting, playing with son. Type: []Constant   []Intermittent  []Radiating []Localized []other:     Numbness/Tingling: none    Occupation/School: House Keeping    Living Status/Prior Level of Function: Independent with ADLs and IADLs      Reflexes/Myotomes:   [x]Dermatomes/Myotomes intact    [x]Reflexes equal and normal bilaterally   []Other:    Joint mobility:    []Normal    [x]Hypo   []Hyper    Palpation: NT    Functional Mobility/Transfers: ind w/ pain    Posture: Decreased TKE with all transfers. Bandages/Dressings/Incisions: na    Gait: (include devices/WB status) decreased TKE, decreased stance time. Orthopedic Special Tests: NT                       [x] Patient history, allergies, meds reviewed. Medical chart reviewed. See intake form. Review Of Systems (ROS):  [x]Performed Review of systems (Integumentary, CardioPulmonary, Neurological) by intake and observation. Intake form has been scanned into medical record. Patient has been instructed to contact their primary care physician regarding ROS issues if not already being addressed at this time.       Co-morbidities/Complexities (which will affect course of rehabilitation):   []None           Arthritic conditions   []Rheumatoid arthritis (M05.9)  [x]Osteoarthritis (M19.91)   Cardiovascular conditions   []Hypertension (I10)  []Hyperlipidemia (E78.5)  []Angina pectoris (I20)  []Atherosclerosis (I70)   Musculoskeletal conditions   []Disc pathology   []Congenital spine pathologies   [x]Prior surgical intervention  []Osteoporosis (M81.8)  []Osteopenia (M85.8)   Endocrine conditions   []Hypothyroid (E03.9)  []Hyperthyroid Gastrointestinal conditions   []Constipation (U03.54)   Metabolic conditions   []Morbid obesity (E66.01)  []Diabetes type 1(E10.65) or 2 (E11.65)   []Neuropathy (G60.9)     Pulmonary conditions   []Asthma (J45)  []Coughing   []COPD (J44.9)   Psychological Disorders  [x]Anxiety (F41.9)  [x]Depression (F32.9)   []Other:   []Other: Barriers to/and or personal factors that will affect rehab potential:              []Age  []Sex              []Motivation/Lack of Motivation                        []Co-Morbidities              []Cognitive Function, education/learning barriers              []Environmental, home barriers              []profession/work barriers  []past PT/medical experience  []other:  Justification:     Falls Risk Assessment (30 days): 10 sec  [x] Falls Risk assessed and no intervention required.   [] Falls Risk assessed and Patient requires intervention due to being higher risk   TUG score (>12s at risk):     [] Falls education provided, including       G-Codes:       ASSESSMENT:   Functional Impairments:     []Noted lumbar/proximal hip/LE joint hypomobility   [x]Decreased LE functional ROM   [x]Decreased core/proximal hip strength and neuromuscular control   [x]Decreased LE functional strength   [x]Reduced balance/proprioceptive control   []other:      Functional Activity Limitations (from functional questionnaire and intake)   []Reduced ability to tolerate prolonged functional positions   []Reduced ability or difficulty with changes of positions or transfers between positions   []Reduced ability to maintain good posture and demonstrate good body mechanics with sitting, bending, and lifting   [x]Reduced ability to sleep   [x] Reduced ability or tolerance with driving and/or computer work   [x]Reduced ability to perform lifting, carrying tasks   [x]Reduced ability to squat   [x]Reduced ability to forward bend   [x]Reduced ability to ambulate prolonged functional periods/distances/surfaces   [x]Reduced ability to ascend/descend stairs   [x]Reduced ability to run, hop, cut or jump   []other:    Participation Restrictions   []Reduced participation in self care activities   [x]Reduced participation in home management activities   [x]Reduced participation in work activities   [x]Reduced participation in social (HIGH) [address] (typically 45 minutes face-to-face)  [] RE-EVAL     PLAN:   Frequency/Duration:  1 days per week for 1 Weeks:  Interventions:  [x]  Therapeutic exercise including: strength training, ROM, for Lower extremity and core   [x]  NMR activation and proprioception for LE, Glutes and Core   [x]  Manual therapy as indicated for LE, Hip and spine to include: Dry Needling/IASTM, STM, PROM, Gr I-IV mobilizations, manipulation. [x] Modalities as needed that may include: thermal agents, E-stim, Biofeedback, US, iontophoresis as indicated  [x] Patient education on joint protection, postural re-education, activity modification, progression of HEP. [x] Patient appears to be functionally prepared for surgery and will continue with a home exercise program until surgery date. [] Patient will be ready for surgery with a short period of structured physical therapy  [] Patient does not appear to be functionally ready for surgery and requires a prolonged  structure program    At this point, it appears patient's discharge status from hospital should be:   [x] Home with home exercise program   [] Home with home health care  [] Skilled nursing facility    GOALS:  Patient stated goal: Prepared for surgery    Therapist goals for Patient:   Short Term Goals: To be achieved in: 2 weeks  1. Independent in HEP and progression per patient tolerance, in order to prevent re-injury. 2. Patient will have a decrease in pain to facilitate improvement in movement, function, and ADLs as indicated by Functional Deficits.        Electronically signed by:  Gabriel Dc PT

## 2020-06-18 ENCOUNTER — TELEPHONE (OUTPATIENT)
Dept: ORTHOPEDIC SURGERY | Age: 40
End: 2020-06-18

## 2020-06-18 NOTE — TELEPHONE ENCOUNTER
COVID; 6/29/2020- Negative   Pt has gastroparesis and may feel ill the morning of surgery. Orthopedic Nurse Navigator Summary  -  Patient Name: Liliane Estes  Anticipated Date of Surgery:  Using [employer]? Yes, Are they Registered: Yes  Attended Pre-Op Education Class: No  If No, why not? PCP:  Phone #:  Date of PCP Visit for H&P: 06/16/2020  Any Noted Concerns from PCP prior to surgery: No  If Yes, what concerns?:  Is the Patient in a Pain Management Program?: No  Review of Past Medical History Reveals History of:  -  Critical Lab Values:  - Hemoglobin (g/dL): Date Value 11.6  - Hematocrit (%): Date Value  - HgbA1C : Date Value 5.0  - Albumin : Date Value 3.8  - BUN (mg/dL) : Date Value 9.0  - CREATININE (mg/dL) : Date Value 0.9  - BMI (kg/m2) : Date Value  -  Coronary Artery Disease/HTN/CHF History: No  Cardiologist:  Cardiac Clearance Necessary: No  Date of Cardiac Clearance Appt: On Plavix? No  If YES, when will they stop taking? Final Cardiac Recommendations: On any anticoagulation: No  -  Diabetes History: No  Most Recent HgbA1C: 5.0  PCP or Endocrine Recommendations:  Nutritionist/Dietician Consult Scheduled:  Final Plan For Diabetic Control:  Pulmonary: COPD/Emphysema/ Use of home oxygen: NONE  Alcohol use: Non-Drinker  -  DVT Risk Stratification: Low Risk  Vascular Consult Ordered:  Date of Vascular Appt:  Hematology/Oncology Consult Ordered:  Date of Hematology/Oncology Appt:  Final Recommendation For DVT Prophylaxis:  Smoking history: Non-Smoker  Use of Estrogen:  -  BMI Greater than 40 at time of scheduling?: No  Has Surgeon been notified of BMI concern? No  Weight Loss Clinic Consult Ordered No  Date of Wt Loss Clinic Appt:  BMI at time of surgery (if went through [facility]):  -  Additional Medical Concerns: Additional Recommendations for above concerns:  Attended Pre-Hab Program: Yes  Anticipated Discharge Disposition: Home with OPT  Who will be with patient at home following discharge?  15 year old son to be with pt.  Mom to check in daily  Equipment patient already has: Kelly Pateldie is standard, crutches, cane  Bedroom on first or second floor: First  Bathroom on first or second floor: First  Weight bearing status: Full  Pre-op ambulatory status:  Number of entry steps: FIVE  Caregiver assistance: Full time  [facility]  06/23/2020

## 2020-06-24 RX ORDER — METOCLOPRAMIDE 10 MG/1
10 TABLET ORAL 4 TIMES DAILY
COMMUNITY

## 2020-06-24 RX ORDER — ESCITALOPRAM OXALATE 20 MG/1
20 TABLET ORAL DAILY
COMMUNITY

## 2020-06-24 RX ORDER — TRAZODONE HYDROCHLORIDE 50 MG/1
50 TABLET ORAL NIGHTLY
COMMUNITY

## 2020-06-24 RX ORDER — HYDROXYZINE HYDROCHLORIDE 25 MG/1
25 TABLET, FILM COATED ORAL 3 TIMES DAILY PRN
COMMUNITY

## 2020-06-24 NOTE — PROGRESS NOTES
PRE OP INSTRUCTION SHEET   1. Do not eat or drink anything after 12 midnight  prior to surgery. This includes no water, chewing gum or mints. 2. Take the following pills will a small sip of water (see MAR)                                        3. Aspirin, Ibuprofen, Advil, Naproxen, Vitamin E, fish oil and other Anti-inflammatory products should be stopped for 5 days before surgery or as directed by your physician. 4. Check with your Doctor regarding stopping Plavix, Coumadin, Lovenox, Fragmin or other blood thinners   5. Do not smoke, and do not drink any alcoholic beverages 24 hours prior to surgery. This includes NA Beer. 6. You may brush your teeth and gargle the morning of surgery. DO NOT SWALLOW WATER   7. You MUST make arrangements for a responsible adult to take you home after your surgery. You will not be allowed to leave alone or drive yourself home. It is strongly suggested someone stay with you the first 24 hrs. Your surgery will be cancelled if you do not have a ride home. 8. A parent/legal guardian must accompany a child scheduled for surgery and plan to stay at the hospital until the child is discharged. Please do not bring other children with you. 9. Please wear simple, loose fitting clothing to the hospital.  Ang[place] Siu not bring valuables (money, credit cards, checkbooks, etc.) Do not wear any makeup (including no eye makeup) or nail polish on your fingers or toes. 10. DO NOT wear any jewelry or piercings on day of surgery. All body piercing jewelry must be removed. 11. If you have dentures,glasses, or contacts they will be removed before going to the OR; we will provide you a container. 12. Please see your family doctor/and cardiologist for a history & physical and/or concerning medications. Bring any test results/reports from your physician's office. Have history and labs faxed to [phone].  Remember to bring Blood Bank bracelet on the day of surgery. 14. If you have a Living Will and Durable Power of  for Healthcare, please bring in a copy. 13. Notify your Surgeon if you develop any illness between now and surgery  time, cough, cold, fever, sore throat, nausea, vomiting, etc.  Please notify your surgeon if you experience dizziness, shortness of breath or blurred vision between now & the time of your surgery   16. DO NOT shave your operative site 96 hours prior to surgery. For face & neck surgery, men may use an electric razor 48 hours prior to surgery. 17. Shower with _x__Antibacterial soap (x_chlorhexidine for total joint  Pt's) shower two times before surgery.(the morning of and the night before. 18. To provide excellent care visitors will be limited to one in the room at any given time.   Please call pre admission testing if you any further questions [phone] or [phone]

## 2020-06-24 NOTE — PROGRESS NOTES
Preoperative Screening for Elective Surgery/Invasive Procedures While COVID-19 present in the community     Have you tested positive or have been told to self-isolate for COVID-19 like symptoms within the past 28 days? no   Do you currently have any of the following symptoms? o Fever >100.0 F or 99.9 F in immunocompromised patients?no  o New onset cough, shortness of breath or difficulty breathing?no  o New onset sore throat, myalgia (muscle aches and pains), headache, loss of taste/smell or diarrhea? no   Have you had a potential exposure to COVID-19 within the past 14 days by:  o Close contact with a confirmed case?no  o Close contact with a healthcare worker,  or essential infrastructure worker (grocery store, [employer], gas station, public utilities or transportation)? yes  o Do you reside in a congregate setting such as; skilled nursing facility, adult home, correctional facility, homeless shelter or other institutional setting?no  o Have you had recent travel to a known COVID-19 hotspot? no    Indicate if the patient has a positive screen by answering yes to one or more of the above questions. Patients who test positive or screen positive prior to surgery or on the day of surgery should be evaluated in conjunction with the surgeon/proceduralist/anesthesiologist to determine the urgency of the procedure.

## 2020-06-25 ENCOUNTER — TELEPHONE (OUTPATIENT)
Dept: ORTHOPEDIC SURGERY | Age: 40
End: 2020-06-25

## 2020-06-29 ENCOUNTER — HOSPITAL ENCOUNTER (OUTPATIENT)
Age: 40
Discharge: HOME | End: 2020-06-29
Payer: COMMERCIAL

## 2020-06-29 LAB
EKG ATRIAL RATE: 51 BPM
EKG DIAGNOSIS: NORMAL
EKG P AXIS: 78 DEGREES
EKG P-R INTERVAL: 136 MS
EKG Q-T INTERVAL: 452 MS
EKG QRS DURATION: 92 MS
EKG QTC CALCULATION (BAZETT): 416 MS
EKG R AXIS: 64 DEGREES
EKG T AXIS: 37 DEGREES
EKG VENTRICULAR RATE: 51 BPM

## 2020-06-29 PROCEDURE — 93041 RHYTHM ECG TRACING: CPT

## 2020-06-29 PROCEDURE — U0003 INFECTIOUS AGENT DETECTION BY NUCLEIC ACID (DNA OR RNA); SEVERE ACUTE RESPIRATORY SYNDROME CORONAVIRUS 2 (SARS-COV-2) (CORONAVIRUS DISEASE [COVID-19]), AMPLIFIED PROBE TECHNIQUE, MAKING USE OF HIGH THROUGHPUT TECHNOLOGIES AS DESCRIBED BY CMS-2020-01-R: HCPCS

## 2020-07-01 LAB
SARS-COV-2: NOT DETECTED
SOURCE: NORMAL

## 2020-07-02 ENCOUNTER — ANESTHESIA EVENT (OUTPATIENT)
Dept: OPERATING ROOM | Age: 40
End: 2020-07-02
Payer: COMMERCIAL

## 2020-07-02 RX ORDER — SODIUM CHLORIDE, SODIUM LACTATE, POTASSIUM CHLORIDE, CALCIUM CHLORIDE 600; 310; 30; 20 MG/100ML; MG/100ML; MG/100ML; MG/100ML
INJECTION, SOLUTION INTRAVENOUS CONTINUOUS
Status: CANCELLED | OUTPATIENT
Start: 2020-07-02

## 2020-07-06 ENCOUNTER — APPOINTMENT (OUTPATIENT)
Dept: GENERAL RADIOLOGY | Age: 40
End: 2020-07-06
Attending: ORTHOPAEDIC SURGERY
Payer: COMMERCIAL

## 2020-07-06 ENCOUNTER — ANESTHESIA (OUTPATIENT)
Dept: OPERATING ROOM | Age: 40
End: 2020-07-06
Payer: COMMERCIAL

## 2020-07-06 ENCOUNTER — HOSPITAL ENCOUNTER (OUTPATIENT)
Age: 40
Discharge: HOME | End: 2020-07-07
Attending: ORTHOPAEDIC SURGERY | Admitting: ORTHOPAEDIC SURGERY
Payer: COMMERCIAL

## 2020-07-06 VITALS
SYSTOLIC BLOOD PRESSURE: 98 MMHG | DIASTOLIC BLOOD PRESSURE: 60 MMHG | OXYGEN SATURATION: 99 % | RESPIRATION RATE: 25 BRPM

## 2020-07-06 PROBLEM — M17.11 OSTEOARTHRITIS OF RIGHT KNEE: Status: ACTIVE | Noted: 2020-07-06

## 2020-07-06 LAB
ABO/RH: NORMAL
ANTIBODY SCREEN: NORMAL
PREGNANCY, URINE: NEGATIVE

## 2020-07-06 PROCEDURE — 2580000003 HC RX 258: Performed by: NURSE ANESTHETIST, CERTIFIED REGISTERED

## 2020-07-06 PROCEDURE — 97116 GAIT TRAINING THERAPY: CPT

## 2020-07-06 PROCEDURE — 2580000003 HC RX 258: Performed by: ANESTHESIOLOGY

## 2020-07-06 PROCEDURE — 6360000002 HC RX W HCPCS: Performed by: ANESTHESIOLOGY

## 2020-07-06 PROCEDURE — 76942 ECHO GUIDE FOR BIOPSY: CPT | Performed by: ANESTHESIOLOGY

## 2020-07-06 PROCEDURE — 7100000000 HC PACU RECOVERY - FIRST 15 MIN: Performed by: ORTHOPAEDIC SURGERY

## 2020-07-06 PROCEDURE — 86901 BLOOD TYPING SEROLOGIC RH(D): CPT

## 2020-07-06 PROCEDURE — 6370000000 HC RX 637 (ALT 250 FOR IP): Performed by: ORTHOPAEDIC SURGERY

## 2020-07-06 PROCEDURE — 6370000000 HC RX 637 (ALT 250 FOR IP): Performed by: ANESTHESIOLOGY

## 2020-07-06 PROCEDURE — 2500000003 HC RX 250 WO HCPCS: Performed by: ORTHOPAEDIC SURGERY

## 2020-07-06 PROCEDURE — L1830 KO IMMOB CANVAS LONG PRE OTS: HCPCS | Performed by: ORTHOPAEDIC SURGERY

## 2020-07-06 PROCEDURE — 7100000001 HC PACU RECOVERY - ADDTL 15 MIN: Performed by: ORTHOPAEDIC SURGERY

## 2020-07-06 PROCEDURE — 2500000003 HC RX 250 WO HCPCS: Performed by: NURSE ANESTHETIST, CERTIFIED REGISTERED

## 2020-07-06 PROCEDURE — 86900 BLOOD TYPING SEROLOGIC ABO: CPT

## 2020-07-06 PROCEDURE — 97530 THERAPEUTIC ACTIVITIES: CPT

## 2020-07-06 PROCEDURE — 3600000005 HC SURGERY LEVEL 5 BASE: Performed by: ORTHOPAEDIC SURGERY

## 2020-07-06 PROCEDURE — C9290 INJ, BUPIVACAINE LIPOSOME: HCPCS | Performed by: ORTHOPAEDIC SURGERY

## 2020-07-06 PROCEDURE — C1776 JOINT DEVICE (IMPLANTABLE): HCPCS | Performed by: ORTHOPAEDIC SURGERY

## 2020-07-06 PROCEDURE — 64447 NJX AA&/STRD FEMORAL NRV IMG: CPT | Performed by: ANESTHESIOLOGY

## 2020-07-06 PROCEDURE — 6360000002 HC RX W HCPCS: Performed by: ORTHOPAEDIC SURGERY

## 2020-07-06 PROCEDURE — 86850 RBC ANTIBODY SCREEN: CPT

## 2020-07-06 PROCEDURE — 3700000001 HC ADD 15 MINUTES (ANESTHESIA): Performed by: ORTHOPAEDIC SURGERY

## 2020-07-06 PROCEDURE — 3700000000 HC ANESTHESIA ATTENDED CARE: Performed by: ORTHOPAEDIC SURGERY

## 2020-07-06 PROCEDURE — 2580000003 HC RX 258: Performed by: ORTHOPAEDIC SURGERY

## 2020-07-06 PROCEDURE — 94150 VITAL CAPACITY TEST: CPT

## 2020-07-06 PROCEDURE — 84703 CHORIONIC GONADOTROPIN ASSAY: CPT

## 2020-07-06 PROCEDURE — 2720000010 HC SURG SUPPLY STERILE: Performed by: ORTHOPAEDIC SURGERY

## 2020-07-06 PROCEDURE — 2500000003 HC RX 250 WO HCPCS: Performed by: ANESTHESIOLOGY

## 2020-07-06 PROCEDURE — 6360000002 HC RX W HCPCS: Performed by: NURSE ANESTHETIST, CERTIFIED REGISTERED

## 2020-07-06 PROCEDURE — C1713 ANCHOR/SCREW BN/BN,TIS/BN: HCPCS | Performed by: ORTHOPAEDIC SURGERY

## 2020-07-06 PROCEDURE — 2709999900 HC NON-CHARGEABLE SUPPLY: Performed by: ORTHOPAEDIC SURGERY

## 2020-07-06 PROCEDURE — 73560 X-RAY EXAM OF KNEE 1 OR 2: CPT

## 2020-07-06 PROCEDURE — 97161 PT EVAL LOW COMPLEX 20 MIN: CPT

## 2020-07-06 PROCEDURE — 3600000015 HC SURGERY LEVEL 5 ADDTL 15MIN: Performed by: ORTHOPAEDIC SURGERY

## 2020-07-06 PROCEDURE — 97165 OT EVAL LOW COMPLEX 30 MIN: CPT

## 2020-07-06 DEVICE — IMPLANTABLE DEVICE: Type: IMPLANTABLE DEVICE | Site: KNEE | Status: FUNCTIONAL

## 2020-07-06 DEVICE — TIBIA BASE SIZE 2 RIGHT                                    MEDIAL/LEFT LATERAL
Type: IMPLANTABLE DEVICE | Site: KNEE | Status: FUNCTIONAL
Brand: ZUK

## 2020-07-06 DEVICE — RALLY HV AB BONE CEMENT 40 GRAMS
Type: IMPLANTABLE DEVICE | Site: KNEE | Status: FUNCTIONAL
Brand: RALLY

## 2020-07-06 DEVICE — JOURNEY UNI OXINIUM FIXED BEARING                                    FEMORAL SIZE 3 RIGHT MEDIAL/LEFT LATERAL
Type: IMPLANTABLE DEVICE | Site: KNEE | Status: FUNCTIONAL
Brand: JOURNEY

## 2020-07-06 RX ORDER — SODIUM CHLORIDE 0.9 % (FLUSH) 0.9 %
10 SYRINGE (ML) INJECTION EVERY 12 HOURS SCHEDULED
Status: DISCONTINUED | OUTPATIENT
Start: 2020-07-06 | End: 2020-07-07 | Stop reason: HOSPADM

## 2020-07-06 RX ORDER — LABETALOL HYDROCHLORIDE 5 MG/ML
5 INJECTION, SOLUTION INTRAVENOUS EVERY 10 MIN PRN
Status: DISCONTINUED | OUTPATIENT
Start: 2020-07-06 | End: 2020-07-06 | Stop reason: HOSPADM

## 2020-07-06 RX ORDER — ACETAMINOPHEN 500 MG
1000 TABLET ORAL ONCE
Status: COMPLETED | OUTPATIENT
Start: 2020-07-06 | End: 2020-07-06

## 2020-07-06 RX ORDER — PROMETHAZINE HYDROCHLORIDE 25 MG/ML
6.25 INJECTION, SOLUTION INTRAMUSCULAR; INTRAVENOUS
Status: DISCONTINUED | OUTPATIENT
Start: 2020-07-06 | End: 2020-07-06 | Stop reason: HOSPADM

## 2020-07-06 RX ORDER — METOCLOPRAMIDE 10 MG/1
10 TABLET ORAL 4 TIMES DAILY
Status: DISCONTINUED | OUTPATIENT
Start: 2020-07-06 | End: 2020-07-07 | Stop reason: HOSPADM

## 2020-07-06 RX ORDER — METOCLOPRAMIDE HYDROCHLORIDE 5 MG/ML
5 INJECTION INTRAMUSCULAR; INTRAVENOUS ONCE
Status: COMPLETED | OUTPATIENT
Start: 2020-07-06 | End: 2020-07-06

## 2020-07-06 RX ORDER — GABAPENTIN 300 MG/1
300 CAPSULE ORAL 3 TIMES DAILY
Status: DISCONTINUED | OUTPATIENT
Start: 2020-07-06 | End: 2020-07-07 | Stop reason: HOSPADM

## 2020-07-06 RX ORDER — DEXAMETHASONE SODIUM PHOSPHATE 4 MG/ML
INJECTION, SOLUTION INTRA-ARTICULAR; INTRALESIONAL; INTRAMUSCULAR; INTRAVENOUS; SOFT TISSUE PRN
Status: DISCONTINUED | OUTPATIENT
Start: 2020-07-06 | End: 2020-07-06 | Stop reason: SDUPTHER

## 2020-07-06 RX ORDER — TRAZODONE HYDROCHLORIDE 50 MG/1
50 TABLET ORAL NIGHTLY
Status: DISCONTINUED | OUTPATIENT
Start: 2020-07-06 | End: 2020-07-07 | Stop reason: HOSPADM

## 2020-07-06 RX ORDER — HYDRALAZINE HYDROCHLORIDE 20 MG/ML
5 INJECTION INTRAMUSCULAR; INTRAVENOUS EVERY 10 MIN PRN
Status: DISCONTINUED | OUTPATIENT
Start: 2020-07-06 | End: 2020-07-06 | Stop reason: HOSPADM

## 2020-07-06 RX ORDER — ONDANSETRON 2 MG/ML
4 INJECTION INTRAMUSCULAR; INTRAVENOUS EVERY 6 HOURS PRN
Status: DISCONTINUED | OUTPATIENT
Start: 2020-07-06 | End: 2020-07-07 | Stop reason: HOSPADM

## 2020-07-06 RX ORDER — MORPHINE SULFATE 10 MG/ML
1 INJECTION, SOLUTION INTRAMUSCULAR; INTRAVENOUS EVERY 5 MIN PRN
Status: DISCONTINUED | OUTPATIENT
Start: 2020-07-06 | End: 2020-07-06 | Stop reason: HOSPADM

## 2020-07-06 RX ORDER — ONDANSETRON 2 MG/ML
INJECTION INTRAMUSCULAR; INTRAVENOUS PRN
Status: DISCONTINUED | OUTPATIENT
Start: 2020-07-06 | End: 2020-07-06 | Stop reason: SDUPTHER

## 2020-07-06 RX ORDER — ROCURONIUM BROMIDE 10 MG/ML
INJECTION, SOLUTION INTRAVENOUS PRN
Status: DISCONTINUED | OUTPATIENT
Start: 2020-07-06 | End: 2020-07-06 | Stop reason: SDUPTHER

## 2020-07-06 RX ORDER — CELECOXIB 100 MG/1
100 CAPSULE ORAL 2 TIMES DAILY
Status: DISCONTINUED | OUTPATIENT
Start: 2020-07-06 | End: 2020-07-07 | Stop reason: HOSPADM

## 2020-07-06 RX ORDER — PROPOFOL 10 MG/ML
INJECTION, EMULSION INTRAVENOUS PRN
Status: DISCONTINUED | OUTPATIENT
Start: 2020-07-06 | End: 2020-07-06 | Stop reason: SDUPTHER

## 2020-07-06 RX ORDER — OXYCODONE HYDROCHLORIDE AND ACETAMINOPHEN 5; 325 MG/1; MG/1
1 TABLET ORAL PRN
Status: DISCONTINUED | OUTPATIENT
Start: 2020-07-06 | End: 2020-07-06 | Stop reason: HOSPADM

## 2020-07-06 RX ORDER — CELECOXIB 200 MG/1
200 CAPSULE ORAL ONCE
Status: COMPLETED | OUTPATIENT
Start: 2020-07-06 | End: 2020-07-06

## 2020-07-06 RX ORDER — SENNA AND DOCUSATE SODIUM 50; 8.6 MG/1; MG/1
1 TABLET, FILM COATED ORAL 2 TIMES DAILY
Status: DISCONTINUED | OUTPATIENT
Start: 2020-07-06 | End: 2020-07-07 | Stop reason: HOSPADM

## 2020-07-06 RX ORDER — KETOROLAC TROMETHAMINE 30 MG/ML
30 INJECTION, SOLUTION INTRAMUSCULAR; INTRAVENOUS EVERY 6 HOURS PRN
Status: DISCONTINUED | OUTPATIENT
Start: 2020-07-06 | End: 2020-07-07 | Stop reason: HOSPADM

## 2020-07-06 RX ORDER — FENTANYL CITRATE 50 UG/ML
INJECTION, SOLUTION INTRAMUSCULAR; INTRAVENOUS PRN
Status: DISCONTINUED | OUTPATIENT
Start: 2020-07-06 | End: 2020-07-06 | Stop reason: SDUPTHER

## 2020-07-06 RX ORDER — GABAPENTIN 300 MG/1
300 CAPSULE ORAL ONCE
Status: COMPLETED | OUTPATIENT
Start: 2020-07-06 | End: 2020-07-06

## 2020-07-06 RX ORDER — MIDAZOLAM HYDROCHLORIDE 1 MG/ML
INJECTION INTRAMUSCULAR; INTRAVENOUS PRN
Status: DISCONTINUED | OUTPATIENT
Start: 2020-07-06 | End: 2020-07-06 | Stop reason: SDUPTHER

## 2020-07-06 RX ORDER — BUPIVACAINE HYDROCHLORIDE 5 MG/ML
INJECTION, SOLUTION EPIDURAL; INTRACAUDAL PRN
Status: DISCONTINUED | OUTPATIENT
Start: 2020-07-06 | End: 2020-07-06 | Stop reason: SDUPTHER

## 2020-07-06 RX ORDER — OXYCODONE HYDROCHLORIDE 5 MG/1
5 TABLET ORAL EVERY 4 HOURS PRN
Status: DISCONTINUED | OUTPATIENT
Start: 2020-07-06 | End: 2020-07-07 | Stop reason: HOSPADM

## 2020-07-06 RX ORDER — ESCITALOPRAM OXALATE 10 MG/1
20 TABLET ORAL DAILY
Status: DISCONTINUED | OUTPATIENT
Start: 2020-07-06 | End: 2020-07-07 | Stop reason: HOSPADM

## 2020-07-06 RX ORDER — PROMETHAZINE HYDROCHLORIDE 25 MG/1
12.5 TABLET ORAL EVERY 6 HOURS PRN
Status: DISCONTINUED | OUTPATIENT
Start: 2020-07-06 | End: 2020-07-07 | Stop reason: HOSPADM

## 2020-07-06 RX ORDER — SODIUM CHLORIDE 9 MG/ML
INJECTION, SOLUTION INTRAVENOUS CONTINUOUS
Status: DISCONTINUED | OUTPATIENT
Start: 2020-07-06 | End: 2020-07-07 | Stop reason: HOSPADM

## 2020-07-06 RX ORDER — ACETAMINOPHEN 325 MG/1
650 TABLET ORAL EVERY 6 HOURS
Status: DISCONTINUED | OUTPATIENT
Start: 2020-07-06 | End: 2020-07-07 | Stop reason: HOSPADM

## 2020-07-06 RX ORDER — AMITRIPTYLINE HYDROCHLORIDE 10 MG/1
10 TABLET, FILM COATED ORAL NIGHTLY PRN
Status: DISCONTINUED | OUTPATIENT
Start: 2020-07-06 | End: 2020-07-07 | Stop reason: HOSPADM

## 2020-07-06 RX ORDER — SODIUM CHLORIDE, SODIUM LACTATE, POTASSIUM CHLORIDE, CALCIUM CHLORIDE 600; 310; 30; 20 MG/100ML; MG/100ML; MG/100ML; MG/100ML
INJECTION, SOLUTION INTRAVENOUS CONTINUOUS PRN
Status: DISCONTINUED | OUTPATIENT
Start: 2020-07-06 | End: 2020-07-06 | Stop reason: SDUPTHER

## 2020-07-06 RX ORDER — MORPHINE SULFATE 10 MG/ML
2 INJECTION, SOLUTION INTRAMUSCULAR; INTRAVENOUS EVERY 5 MIN PRN
Status: DISCONTINUED | OUTPATIENT
Start: 2020-07-06 | End: 2020-07-06 | Stop reason: HOSPADM

## 2020-07-06 RX ORDER — PANTOPRAZOLE SODIUM 40 MG/1
40 TABLET, DELAYED RELEASE ORAL
Status: DISCONTINUED | OUTPATIENT
Start: 2020-07-06 | End: 2020-07-07 | Stop reason: HOSPADM

## 2020-07-06 RX ORDER — MIDAZOLAM HYDROCHLORIDE 1 MG/ML
INJECTION INTRAMUSCULAR; INTRAVENOUS
Status: COMPLETED
Start: 2020-07-06 | End: 2020-07-06

## 2020-07-06 RX ORDER — TRANEXAMIC ACID 100 MG/ML
INJECTION, SOLUTION INTRAVENOUS PRN
Status: DISCONTINUED | OUTPATIENT
Start: 2020-07-06 | End: 2020-07-06 | Stop reason: SDUPTHER

## 2020-07-06 RX ORDER — SODIUM CHLORIDE 0.9 % (FLUSH) 0.9 %
10 SYRINGE (ML) INJECTION PRN
Status: DISCONTINUED | OUTPATIENT
Start: 2020-07-06 | End: 2020-07-06 | Stop reason: HOSPADM

## 2020-07-06 RX ORDER — OXYCODONE HYDROCHLORIDE AND ACETAMINOPHEN 5; 325 MG/1; MG/1
2 TABLET ORAL PRN
Status: DISCONTINUED | OUTPATIENT
Start: 2020-07-06 | End: 2020-07-06 | Stop reason: HOSPADM

## 2020-07-06 RX ORDER — MAGNESIUM HYDROXIDE 1200 MG/15ML
LIQUID ORAL CONTINUOUS PRN
Status: COMPLETED | OUTPATIENT
Start: 2020-07-06 | End: 2020-07-06

## 2020-07-06 RX ORDER — LIDOCAINE HYDROCHLORIDE 20 MG/ML
INJECTION, SOLUTION INFILTRATION; PERINEURAL PRN
Status: DISCONTINUED | OUTPATIENT
Start: 2020-07-06 | End: 2020-07-06 | Stop reason: SDUPTHER

## 2020-07-06 RX ORDER — LIDOCAINE HYDROCHLORIDE 10 MG/ML
0.3 INJECTION, SOLUTION EPIDURAL; INFILTRATION; INTRACAUDAL; PERINEURAL
Status: COMPLETED | OUTPATIENT
Start: 2020-07-06 | End: 2020-07-06

## 2020-07-06 RX ORDER — SODIUM CHLORIDE 0.9 % (FLUSH) 0.9 %
10 SYRINGE (ML) INJECTION PRN
Status: DISCONTINUED | OUTPATIENT
Start: 2020-07-06 | End: 2020-07-07 | Stop reason: HOSPADM

## 2020-07-06 RX ORDER — SODIUM CHLORIDE, SODIUM LACTATE, POTASSIUM CHLORIDE, CALCIUM CHLORIDE 600; 310; 30; 20 MG/100ML; MG/100ML; MG/100ML; MG/100ML
INJECTION, SOLUTION INTRAVENOUS CONTINUOUS
Status: DISCONTINUED | OUTPATIENT
Start: 2020-07-06 | End: 2020-07-06

## 2020-07-06 RX ORDER — SODIUM CHLORIDE 0.9 % (FLUSH) 0.9 %
10 SYRINGE (ML) INJECTION EVERY 12 HOURS SCHEDULED
Status: DISCONTINUED | OUTPATIENT
Start: 2020-07-06 | End: 2020-07-06 | Stop reason: HOSPADM

## 2020-07-06 RX ORDER — HYDROXYZINE PAMOATE 25 MG/1
25 CAPSULE ORAL 3 TIMES DAILY PRN
Status: DISCONTINUED | OUTPATIENT
Start: 2020-07-06 | End: 2020-07-07 | Stop reason: HOSPADM

## 2020-07-06 RX ORDER — METOCLOPRAMIDE HYDROCHLORIDE 5 MG/ML
INJECTION INTRAMUSCULAR; INTRAVENOUS
Status: DISPENSED
Start: 2020-07-06 | End: 2020-07-06

## 2020-07-06 RX ORDER — MEPERIDINE HYDROCHLORIDE 25 MG/ML
12.5 INJECTION INTRAMUSCULAR; INTRAVENOUS; SUBCUTANEOUS EVERY 5 MIN PRN
Status: DISCONTINUED | OUTPATIENT
Start: 2020-07-06 | End: 2020-07-06 | Stop reason: HOSPADM

## 2020-07-06 RX ORDER — ONDANSETRON 2 MG/ML
4 INJECTION INTRAMUSCULAR; INTRAVENOUS
Status: COMPLETED | OUTPATIENT
Start: 2020-07-06 | End: 2020-07-06

## 2020-07-06 RX ORDER — DIPHENHYDRAMINE HYDROCHLORIDE 50 MG/ML
12.5 INJECTION INTRAMUSCULAR; INTRAVENOUS
Status: DISCONTINUED | OUTPATIENT
Start: 2020-07-06 | End: 2020-07-06 | Stop reason: HOSPADM

## 2020-07-06 RX ADMIN — LIDOCAINE HYDROCHLORIDE 0.1 ML: 10 INJECTION, SOLUTION EPIDURAL; INFILTRATION; INTRACAUDAL; PERINEURAL at 06:37

## 2020-07-06 RX ADMIN — FENTANYL CITRATE 25 MCG: 50 INJECTION INTRAMUSCULAR; INTRAVENOUS at 08:45

## 2020-07-06 RX ADMIN — FENTANYL CITRATE 50 MCG: 50 INJECTION INTRAMUSCULAR; INTRAVENOUS at 09:26

## 2020-07-06 RX ADMIN — LIDOCAINE HYDROCHLORIDE 40 MG: 20 INJECTION, SOLUTION INFILTRATION; PERINEURAL at 07:33

## 2020-07-06 RX ADMIN — ONDANSETRON 4 MG: 2 INJECTION, SOLUTION INTRAMUSCULAR; INTRAVENOUS at 07:39

## 2020-07-06 RX ADMIN — MIDAZOLAM 4 MG: 1 INJECTION INTRAMUSCULAR; INTRAVENOUS at 07:09

## 2020-07-06 RX ADMIN — DEXAMETHASONE SODIUM PHOSPHATE 10 MG: 4 INJECTION, SOLUTION INTRAMUSCULAR; INTRAVENOUS at 07:39

## 2020-07-06 RX ADMIN — SODIUM CHLORIDE, POTASSIUM CHLORIDE, SODIUM LACTATE AND CALCIUM CHLORIDE: 600; 310; 30; 20 INJECTION, SOLUTION INTRAVENOUS at 08:48

## 2020-07-06 RX ADMIN — CELECOXIB 200 MG: 200 CAPSULE ORAL at 06:32

## 2020-07-06 RX ADMIN — OXYCODONE HYDROCHLORIDE 5 MG: 5 TABLET ORAL at 19:29

## 2020-07-06 RX ADMIN — TRANEXAMIC ACID 1000 MG: 100 INJECTION, SOLUTION INTRAVENOUS at 07:43

## 2020-07-06 RX ADMIN — SODIUM CHLORIDE: 9 INJECTION, SOLUTION INTRAVENOUS at 15:47

## 2020-07-06 RX ADMIN — Medication 2 G: at 18:26

## 2020-07-06 RX ADMIN — METOCLOPRAMIDE 10 MG: 10 TABLET ORAL at 18:29

## 2020-07-06 RX ADMIN — SODIUM CHLORIDE, POTASSIUM CHLORIDE, SODIUM LACTATE AND CALCIUM CHLORIDE: 600; 310; 30; 20 INJECTION, SOLUTION INTRAVENOUS at 10:14

## 2020-07-06 RX ADMIN — METOCLOPRAMIDE 10 MG: 10 TABLET ORAL at 20:30

## 2020-07-06 RX ADMIN — ACETAMINOPHEN 650 MG: 325 TABLET ORAL at 20:30

## 2020-07-06 RX ADMIN — SODIUM CHLORIDE: 9 INJECTION, SOLUTION INTRAVENOUS at 18:26

## 2020-07-06 RX ADMIN — ACETAMINOPHEN 1000 MG: 500 TABLET ORAL at 06:32

## 2020-07-06 RX ADMIN — TRAZODONE HYDROCHLORIDE 50 MG: 50 TABLET ORAL at 20:30

## 2020-07-06 RX ADMIN — ONDANSETRON HYDROCHLORIDE 4 MG: 2 INJECTION, SOLUTION INTRAMUSCULAR; INTRAVENOUS at 10:43

## 2020-07-06 RX ADMIN — FENTANYL CITRATE 25 MCG: 50 INJECTION INTRAMUSCULAR; INTRAVENOUS at 07:33

## 2020-07-06 RX ADMIN — ROCURONIUM BROMIDE 40 MG: 10 INJECTION, SOLUTION INTRAVENOUS at 07:33

## 2020-07-06 RX ADMIN — GABAPENTIN 300 MG: 300 CAPSULE ORAL at 06:31

## 2020-07-06 RX ADMIN — BUPIVACAINE HYDROCHLORIDE 30 ML: 5 INJECTION, SOLUTION EPIDURAL; INTRACAUDAL; PERINEURAL at 07:09

## 2020-07-06 RX ADMIN — ROCURONIUM BROMIDE 10 MG: 10 INJECTION, SOLUTION INTRAVENOUS at 09:01

## 2020-07-06 RX ADMIN — Medication 2 G: at 07:24

## 2020-07-06 RX ADMIN — METOCLOPRAMIDE 5 MG: 5 INJECTION, SOLUTION INTRAMUSCULAR; INTRAVENOUS at 11:23

## 2020-07-06 RX ADMIN — Medication 10 ML: at 20:31

## 2020-07-06 RX ADMIN — SODIUM CHLORIDE, SODIUM LACTATE, POTASSIUM CHLORIDE, AND CALCIUM CHLORIDE: .6; .31; .03; .02 INJECTION, SOLUTION INTRAVENOUS at 06:36

## 2020-07-06 RX ADMIN — ENOXAPARIN SODIUM 40 MG: 40 INJECTION SUBCUTANEOUS at 18:29

## 2020-07-06 RX ADMIN — SENNOSIDES AND DOCUSATE SODIUM 1 TABLET: 8.6; 5 TABLET ORAL at 20:30

## 2020-07-06 RX ADMIN — PROPOFOL 200 MG: 10 INJECTION, EMULSION INTRAVENOUS at 07:33

## 2020-07-06 RX ADMIN — GABAPENTIN 300 MG: 300 CAPSULE ORAL at 15:46

## 2020-07-06 RX ADMIN — GABAPENTIN 300 MG: 300 CAPSULE ORAL at 20:30

## 2020-07-06 RX ADMIN — CELECOXIB 100 MG: 100 CAPSULE ORAL at 20:31

## 2020-07-06 RX ADMIN — ACETAMINOPHEN 650 MG: 325 TABLET ORAL at 15:46

## 2020-07-06 RX ADMIN — TRANEXAMIC ACID 1000 MG: 100 INJECTION, SOLUTION INTRAVENOUS at 09:50

## 2020-07-06 RX ADMIN — SODIUM CHLORIDE, POTASSIUM CHLORIDE, SODIUM LACTATE AND CALCIUM CHLORIDE: 600; 310; 30; 20 INJECTION, SOLUTION INTRAVENOUS at 07:26

## 2020-07-06 RX ADMIN — SUGAMMADEX 100 MG: 100 INJECTION, SOLUTION INTRAVENOUS at 10:14

## 2020-07-06 RX ADMIN — METOCLOPRAMIDE 5 MG: 5 INJECTION, SOLUTION INTRAMUSCULAR; INTRAVENOUS at 12:13

## 2020-07-06 ASSESSMENT — PAIN SCALES - GENERAL
PAINLEVEL_OUTOF10: 5
PAINLEVEL_OUTOF10: 0
PAINLEVEL_OUTOF10: 3
PAINLEVEL_OUTOF10: 5
PAINLEVEL_OUTOF10: 3

## 2020-07-06 ASSESSMENT — PULMONARY FUNCTION TESTS
PIF_VALUE: 0
PIF_VALUE: 2
PIF_VALUE: 18
PIF_VALUE: 6
PIF_VALUE: 19
PIF_VALUE: 16
PIF_VALUE: 3
PIF_VALUE: 6
PIF_VALUE: 16
PIF_VALUE: 18
PIF_VALUE: 14
PIF_VALUE: 16
PIF_VALUE: 16
PIF_VALUE: 19
PIF_VALUE: 18
PIF_VALUE: 17
PIF_VALUE: 16
PIF_VALUE: 17
PIF_VALUE: 6
PIF_VALUE: 16
PIF_VALUE: 18
PIF_VALUE: 16
PIF_VALUE: 16
PIF_VALUE: 17
PIF_VALUE: 18
PIF_VALUE: 0
PIF_VALUE: 14
PIF_VALUE: 17
PIF_VALUE: 17
PIF_VALUE: 14
PIF_VALUE: 4
PIF_VALUE: 18
PIF_VALUE: 17
PIF_VALUE: 23
PIF_VALUE: 16
PIF_VALUE: 17
PIF_VALUE: 16
PIF_VALUE: 19
PIF_VALUE: 13
PIF_VALUE: 1
PIF_VALUE: 3
PIF_VALUE: 17
PIF_VALUE: 14
PIF_VALUE: 6
PIF_VALUE: 14
PIF_VALUE: 17
PIF_VALUE: 16
PIF_VALUE: 17
PIF_VALUE: 4
PIF_VALUE: 17
PIF_VALUE: 16
PIF_VALUE: 16
PIF_VALUE: 6
PIF_VALUE: 13
PIF_VALUE: 0
PIF_VALUE: 17
PIF_VALUE: 18
PIF_VALUE: 19
PIF_VALUE: 16
PIF_VALUE: 14
PIF_VALUE: 16
PIF_VALUE: 27
PIF_VALUE: 18
PIF_VALUE: 17
PIF_VALUE: 14
PIF_VALUE: 14
PIF_VALUE: 16
PIF_VALUE: 16
PIF_VALUE: 18
PIF_VALUE: 14
PIF_VALUE: 18
PIF_VALUE: 14
PIF_VALUE: 4
PIF_VALUE: 17
PIF_VALUE: 17
PIF_VALUE: 6
PIF_VALUE: 16
PIF_VALUE: 17
PIF_VALUE: 7
PIF_VALUE: 3
PIF_VALUE: 18
PIF_VALUE: 17
PIF_VALUE: 6
PIF_VALUE: 14
PIF_VALUE: 0
PIF_VALUE: 17
PIF_VALUE: 6
PIF_VALUE: 16
PIF_VALUE: 14
PIF_VALUE: 14
PIF_VALUE: 18
PIF_VALUE: 17
PIF_VALUE: 17
PIF_VALUE: 18
PIF_VALUE: 18
PIF_VALUE: 16
PIF_VALUE: 18
PIF_VALUE: 16
PIF_VALUE: 16
PIF_VALUE: 18
PIF_VALUE: 20
PIF_VALUE: 17
PIF_VALUE: 16
PIF_VALUE: 17
PIF_VALUE: 12
PIF_VALUE: 16
PIF_VALUE: 6
PIF_VALUE: 14
PIF_VALUE: 17
PIF_VALUE: 18
PIF_VALUE: 17
PIF_VALUE: 6
PIF_VALUE: 17
PIF_VALUE: 0
PIF_VALUE: 17
PIF_VALUE: 17
PIF_VALUE: 14
PIF_VALUE: 19
PIF_VALUE: 17
PIF_VALUE: 16
PIF_VALUE: 0
PIF_VALUE: 17
PIF_VALUE: 16
PIF_VALUE: 17
PIF_VALUE: 14
PIF_VALUE: 17
PIF_VALUE: 16
PIF_VALUE: 3
PIF_VALUE: 16
PIF_VALUE: 17
PIF_VALUE: 16
PIF_VALUE: 18
PIF_VALUE: 17
PIF_VALUE: 3
PIF_VALUE: 16
PIF_VALUE: 18
PIF_VALUE: 17
PIF_VALUE: 16
PIF_VALUE: 2
PIF_VALUE: 17
PIF_VALUE: 12
PIF_VALUE: 11
PIF_VALUE: 17
PIF_VALUE: 18
PIF_VALUE: 16
PIF_VALUE: 14
PIF_VALUE: 3
PIF_VALUE: 17
PIF_VALUE: 14
PIF_VALUE: 6
PIF_VALUE: 17
PIF_VALUE: 6
PIF_VALUE: 17
PIF_VALUE: 18
PIF_VALUE: 17
PIF_VALUE: 17
PIF_VALUE: 19
PIF_VALUE: 4
PIF_VALUE: 17
PIF_VALUE: 16
PIF_VALUE: 18
PIF_VALUE: 16
PIF_VALUE: 6
PIF_VALUE: 5
PIF_VALUE: 7
PIF_VALUE: 17
PIF_VALUE: 7
PIF_VALUE: 18
PIF_VALUE: 13

## 2020-07-06 ASSESSMENT — PAIN - FUNCTIONAL ASSESSMENT: PAIN_FUNCTIONAL_ASSESSMENT: 0-10

## 2020-07-06 ASSESSMENT — PAIN DESCRIPTION - LOCATION
LOCATION: KNEE
LOCATION: KNEE

## 2020-07-06 ASSESSMENT — PAIN DESCRIPTION - DESCRIPTORS
DESCRIPTORS: BURNING
DESCRIPTORS: ACHING;CONSTANT
DESCRIPTORS: ACHING

## 2020-07-06 ASSESSMENT — PAIN DESCRIPTION - ORIENTATION
ORIENTATION: LEFT
ORIENTATION: LEFT

## 2020-07-06 ASSESSMENT — PAIN DESCRIPTION - PAIN TYPE
TYPE: ACUTE PAIN
TYPE: SURGICAL PAIN

## 2020-07-06 ASSESSMENT — PAIN DESCRIPTION - ONSET: ONSET: ON-GOING

## 2020-07-06 ASSESSMENT — LIFESTYLE VARIABLES: SMOKING_STATUS: 1

## 2020-07-06 NOTE — PROGRESS NOTES
.Patient left the floor in stable condition to room 228-01 with transport and all belongings. Tim Beal

## 2020-07-06 NOTE — PROGRESS NOTES
AWAITING BED ASSIGNMENT;  HAS BEEN UPDATED; PT REMAINS NAUSEATED; DR. Crispin Katz WAS CALLED AND REGLAN 5MG ORDERED IV;  SEE MAR; WILL CONTINUE TO MONITOR

## 2020-07-06 NOTE — ANESTHESIA PROCEDURE NOTES
Peripheral Block    Patient location during procedure: pre-op  Staffing  Anesthesiologist: Storm Duong MD  Performed: anesthesiologist   Preanesthetic Checklist  Completed: patient identified, site marked, surgical consent, pre-op evaluation, timeout performed, IV checked, risks and benefits discussed, monitors and equipment checked, anesthesia consent given, oxygen available and patient being monitored  Peripheral Block  Patient position: supine  Prep: ChloraPrep  Patient monitoring: continuous pulse ox and IV access  Block type: Femoral  Laterality: left  Injection technique: single-shot  Procedures: ultrasound guided  Infiltration strength: 1 %  Dose: 3 mL  Adductor canal (Low Femoral)  Provider prep: mask and sterile gloves  Needle  Needle type: combined needle/nerve stimulator   Needle gauge: 21 G  Needle length: 10 cm  Needle localization: ultrasound guidance  Assessment  Injection assessment: negative aspiration for heme, no paresthesia on injection and local visualized surrounding nerve on ultrasound  Paresthesia pain: none  Slow fractionated injection: yes  Hemodynamics: stable  Additional Notes  Immediately prior to procedure a \"time out\" was called to verify the correct patient, allergies, laterality, procedure and equipment. Time out performed with  RN    Local Anesthetic: 0.5 %  Bupivacaine plus epi 1 to 200K  Amount: 20 ml  in 5 ml increments after negative aspiration each time.     Reason for block: post-op pain management and at surgeon's request You can access the FollowMyHealth Patient Portal offered by [facility] by registering at the following website: [email or web address]. By joining [facility]’s FollowMyHealth portal, you will also be able to view your health information using other applications (apps) compatible with our system.

## 2020-07-06 NOTE — PROGRESS NOTES
Bedside report and pt care transferred [facility] Yoselyn Espinoza RN

## 2020-07-06 NOTE — ANESTHESIA PROCEDURE NOTES
Peripheral Block    Patient location during procedure: pre-op  Staffing  Anesthesiologist: Ceci Easley MD  Performed: anesthesiologist   Preanesthetic Checklist  Completed: patient identified, site marked, surgical consent, pre-op evaluation, timeout performed, IV checked, risks and benefits discussed, monitors and equipment checked, anesthesia consent given, oxygen available and patient being monitored  Peripheral Block  Patient position: right lateral decubitus  Prep: ChloraPrep  Patient monitoring: continuous pulse ox and IV access  Block type: iPacks  Laterality: left  Injection technique: single-shot  Procedures: ultrasound guided  Local infiltration: lidocaine  Infiltration strength: 1 %  Dose: 3 mL  Provider prep: mask and sterile gloves  Local infiltration: lidocaine  Needle  Needle type: combined needle/nerve stimulator   Needle gauge: 21 G  Needle length: 10 cm  Needle localization: ultrasound guidance  Assessment  Injection assessment: negative aspiration for heme, no paresthesia on injection and local visualized surrounding nerve on ultrasound  Paresthesia pain: none  Slow fractionated injection: yes  Hemodynamics: stable  Additional Notes  Immediately prior to procedure a \"time out\" was called to verify the correct patient, allergies, laterality, procedure and equipment. Time out performed with  RN    Local Anesthetic: 0.125 %  Bupivacaine plus epi 1 to 400K  Amount: 20 ml  in 5 ml increments after negative aspiration each time.         Reason for block: post-op pain management and at surgeon's request

## 2020-07-06 NOTE — PROGRESS NOTES
Inpatient Physical Therapy Evaluation and Treatment    [facility]  Date:  7/6/2020  Patient Name:    Allan Collins  Admitting diagnosis:  Osteoarthritis of right knee, unspecified osteoarthritis type [M17.11]  Admit Date:  7/6/2020  Precautions/Restrictions/WB Status/ Lines/ Wounds/ Oxygen: Fall risk, Bed/chair alarm, Lines -IV, WB Restrictions (full WBAT) and Knee immobilizer    Treatment Time:  15:25-16:05  Treatment Number:  1   Timed Code Treatment Minutes: 10 minutes (10 minute eval + 20 minutes split with OT due to pt status of \"outpatient in a bed\")  Total Treatment Minutes:  40  minutes    Patient Goals for Therapy: \" go to my mom's house tomorrow \"          Discharge Recommendations: Home 24 hr assist  and Home PT (likely good candidate for OP PT if she would like)  DME needs for discharge: Needs Met - family to bring in walker tomorrow for therapy session       Therapy recommendation for EMS Transport: can transport by wheelchair    Therapy recommendations for staff:   Assist of 1 with use of rolling walker (RW) and knee immobilizer for all transfers and ambulation to/from [facility]  to/from chair    History of Present Illness: 42 yo female admitted to [facility] after L partial knee arthroplasty (lateral compartment only) on 7/6/20. Full WBAT, knee immobilizer on (d/c once able to complete 10 SLR indep)    Home Health S4 Level Recommendation:  Level 1 Standard  AM-PAC Mobility Score    AM-PAC Inpatient Mobility Raw Score : 20       Preadmission Environment    Pt.  Lives with family (son (15 yo) and spouse))    Pt plans on going to her mother's house - Mother will be home 24/7, son and spouse will also be there  Home environment:  one story home  Steps to enter first floor: No steps  Bathroom: tub/shower unit, grab bars and tub transfer bench; comfort height toilet  Equipment owned: RW, crutches, shower chair/bench and lift chair   Pt sleeps in flat bed    Preadmission Status:  Pt. Able to drive: Yes  Pt Fully independent with ADLs: Yes  Pt. Required assistance from family for: Independent PTA  Pt. independent for transfers and gait and walked with No Device - pt would stand for a minute before walking  History of falls No - L LE was getting weak but no falls    Pain   Yes  Location: L LE  Rating: mild /10  Pain Medicine Status: No request made    Cognition    A&O x4   Able to follow 2 step commands    Subjective  Patient lying supine in bed with spouse present. Pt agreeable to this PT eval & tx. Upper Extremity ROM/Strength  Please see OT evaluation.       Lower Extremity ROM / Strength   AROM WFL: Yes  ROM limitations: L knee not assessed 2/2 post-op day 0    Strength Assessment (measured on a 0-5 scale):  R LE   Quad   4+   Ant Tib  5/5   Hamstring 4+   Iliopsoas 4  L LE   Deferred testing    Lower Extremity Sensation    WNL (L LE not entirely assessed due to ACE wrap on LE)    Lower Extremity Proprioception:   WFL    Coordination and Tone  WFL    Balance  Sitting:  Good - ; Supervision (static), SBA (dynamic)  Comments: 6 minutes at EOB with intermittent unilateral-bilateral support    Standing: Fair +; CGA  Comments: 1 minute with unilateral support for pulling up pants, mild sway noted    Bed Mobility   Supine to Sit:    Modified Independent with KI on  Sit to Supine:   Not Tested  Rolling:   Not Tested  Scooting in sitting: Modified Independent with KI on  Scooting in supine:  Independent    Transfer Training     Sit to stand:   CGA with VC for hand placement  Stand to sit:   CGA  Bed to Chair:   CGA with use of gait belt and rolling walker (RW)    Gait gait completed as indicated below  Distance:      18 ft  Deviations (firm surface/linoleum):  decreased joan, narrow STEPHEN, step to pattern, antalgic pattern, decreased step length bilaterally and decreased stance time on left  Assistive Device Used:    gait belt, rolling walker (RW) and knee immobilizer on left  Level of Assist:    CGA  Comment: therapist be seen 5-7 x / week BID while in acute care setting for therapeutic exercises, bed mobility, transfers, progressive gait training, balance training, and family/patient education. Signature: Namita Perry, PT, DPT #[id number]    If patient discharges from this facility prior to next visit, this note will serve as the Discharge Summary.

## 2020-07-06 NOTE — PROGRESS NOTES
Pt's scheduled Tylenol and Gabapentin administered per order, see MAR. Pt sitting at bedside working with PT. No other needs or wants voiced at this time. Call light in reach.

## 2020-07-06 NOTE — H&P
I have reviewed the history and physical and examined the patient and find no relevant changes. I have reviewed with the patient and/or family the risks, benefits, and alternatives to the procedure. Past Medical History:   Diagnosis Date    Anxiety     Arthritis     Difficult intubation     pt states had problems with extubation with previous surgery    Gastroparesis     Hiatal hernia     PONV (postoperative nausea and vomiting)        Past Surgical History:   Procedure Laterality Date     SECTION      CHOLECYSTECTOMY      COLONOSCOPY      GALLBLADDER SURGERY      KNEE SURGERY      KNEE SURGERY Right 2017    KNEE SURGERY Right 2017    RIGHT KNEE UNICOMPARTMENTAL ARTHROPLASTY, LATERAL       Scheduled Meds:   ceFAZolin  2 g Intravenous Once    celecoxib  100 mg Oral BID    gabapentin  300 mg Oral TID    sodium chloride flush  10 mL Intravenous 2 times per day    midazolam         Continuous Infusions:   lactated ringers 125 mL/hr at 20 0636     PRN Meds:.sodium chloride flush, HYDROmorphone, HYDROmorphone, morphine, morphine, oxyCODONE-acetaminophen **OR** oxyCODONE-acetaminophen, diphenhydrAMINE, ondansetron, promethazine, labetalol, hydrALAZINE, meperidine    Allergies   Allergen Reactions    Percocet [Oxycodone-Acetaminophen] Nausea Only     Has tolerated IV Acetaminophen.  Vicodin [Hydrocodone-Acetaminophen] Nausea Only     Has tolerated IV Acetaminophen.     Morphine Nausea And Vomiting       Vitals:    20 0622   BP: 124/86   Pulse: 86   Resp: 20   Temp: 97.5 °F (36.4 °C)   SpO2: 98%         Lloyd West DO  2020

## 2020-07-06 NOTE — PROGRESS NOTES
Inpatient Occupational Therapy  Evaluation and Treatment    Unit: [facility]  Date:  7/6/2020  Patient Name:    Kathy Aguilar  Admitting diagnosis:  Osteoarthritis of right knee, unspecified osteoarthritis type [M17.11]  Admit Date:  7/6/2020  Precautions/Restrictions/WB Status/ Lines/ Wounds/ Oxygen: fall risk, IV, bed/chair alarm, knee immobilizer and WB restrictions (FWB)     Treatment Time:  1944-9919  Treatment Number: 1     Billable Treatment Time: 10 minutes   Total Treatment Time:   40   Minutes (10 min eval +20 split with OT due to status of OP    Patient Goals for Therapy:   return home       Discharge Recommendations: Home with 24/7 assist and home therapy  DME needs for discharge: Needs Met       Therapy recommendations for staff:   Assist of 1 with use of rolling walker (RW) for all transfers to/from BSC/chair    History of Present Illness: 41 y.o female admitted for elective knee sx. 7/6 LEFT KNEE LATERAL COMPARTMENT UNIARTHROPLASTY. PmHx: anxiety, arthritis, gastroparesis, hiatal hernia, Right knee sx on 8/2017    Home Health S4 Level Recommendation:  Level 1 Standard  AM-PAC Score:      Preadmission Environment    Pt. Lives with family (son (17 yo) and spouse))              Pt plans on going to her mother's house - Mother will be home 24/7, son and spouse will also be there  Home environment:            one story home  Steps to enter first floor: No steps  Bathroom: tub/shower unit, grab bars and tub transfer bench; comfort height toilet  Equipment owned: RW, crutches, shower chair/bench and lift chair   Pt sleeps in flat bed     Preadmission Status:  Pt. Able to drive: Yes  Pt Fully independent with ADLs: Yes  Pt. Required assistance from family for:  Independent PTA  Pt. independent for transfers and gait and walked with No Device - pt would stand for a minute before walking  History of falls          No - L LE was getting weak but no falls     Pain   Yes  Location: L LE  Rating: mild /10  Pain Medicine Status: No request made     Cognition    A&O x4   Able to follow 2 step commands     Subjective  Patient lying supine in bed with spouse present. Pt agreeable to this OT eval & tx. Upper Extremity ROM:    WFL,  pt able to perform all bed mobility, transfers, and gait without ROM limitation. Upper Extremity Strength:    BUE strength WFL, but not formally assessed w/ MMT    Upper Extremity Sensation    WNL    Upper Extremity Proprioception:  WNL    Coordination and Tone  WNL    Balance  Functional Sitting Balance: WNL  Functional Standing Balance:Impaired CGA with RW    Bed mobility:    Supine to sit:   Modified Independent  Sit to supine:   Not Tested  Rolling:    Not Tested  Scooting in sitting:  Modified Independent  Scooting to head of bed:   Not Tested    Bridging:   Not Tested    Transfers:    Sit to stand:  CGA  Stand to sit:  CGA  Bed to chair:   [facility]  Standard toilet: Not Tested  Bed to [facility]:  Not Tested    Dressing:      UE:   Not Tested  LE:    CGA    Bathing:    UE:  Not Tested  LE:  Not Tested    Eating:   Independent    Toileting:  Not Tested    Activity Tolerance   Pt completed therapy session with No adverse symptoms noted w/activity  SpO2:   HR: 85  BP: 134/87    Positioning Needs:   Up in chair, call light and needs in reach. Up in chair, LE elevated to promote extension, Cold pack applied. Alarm Set    Exercise / Activities Initiated:   N/A    Patient/Family Education:   Role of OT  Recommendations for DC  Safe RW use/hand placement    Assessment of Deficits: Pt seen for Occupational therapy evaluation in acute care setting. Pt demonstrated decreased Activity tolerance, ADLs, IADLs, Balance , Bed mobility, Transfers and Coping Skills. Pt functioning below baseline and will likely benefit from skilled occupational therapy services to maximize safety and independence. Goal(s) : To be met in 3 Visits:  1).  Bed to toilet/BSC: Modified Independent    To be met in 5 Visits:  2). Upper Body Bathing:   Modified Independent  3). Lower Body Bathing:   Modified Independent  4). Upper Body Dressing:  Modified Independent  5). Lower Body Dressing:  Modified Independent    Rehabilitation Potential:  Good for goals listed above. Strengths for achieving goals include: Pt motivated and Pt cooperative  Barriers to achieving goals include:  Pain     Plan: To be seen 3-5 x/wk while in acute care setting for therapeutic exercises, bed mobility, transfers, dressing, bathing, family/patient education, ADL/IADL retraining, energy conservation training.      Fozia Flores OTR/L [id number]          If patient discharges from this facility prior to next visit, this note will serve as the Discharge Summary

## 2020-07-06 NOTE — ANESTHESIA POSTPROCEDURE EVALUATION
Department of Anesthesiology  Postprocedure Note    Patient: Ralph Caballero  MRN: [id number]  YOB: 1980  Date of evaluation: 7/6/2020  Time:  3:54 PM     Procedure Summary     Date:  07/06/20 Room / Location:  [address] [place] [address] / [facility]    Anesthesia Start:  7237 Anesthesia Stop:  1027    Procedure:  LEFT KNEE LATERAL COMPARTMENT UNIARTHROPLASTY (Left Knee) Diagnosis:  (OSTEOARTHRITIS OF LEFT KNEE)    Surgeon:  Summer Duffy DO Responsible Provider:  Robert Chamberlain MD    Anesthesia Type:  spinal ASA Status:  3          Anesthesia Type: spinal    Abisai Phase I: Abisai Score: 10    Abisai Phase II:      Last vitals: Reviewed and per EMR flowsheets. Anesthesia Post Evaluation    Patient location during evaluation: PACU  Patient participation: complete - patient participated  Level of consciousness: awake and alert  Airway patency: patent  Nausea & Vomiting: no vomiting and nausea  Complications: no  Cardiovascular status: blood pressure returned to baseline  Respiratory status: acceptable  Hydration status: euvolemic  Comments: VSS on transfer to phase 2 recovery. No anesthetic complications.

## 2020-07-06 NOTE — PROGRESS NOTES
Time out done with Dr. Bogdan Ervin, left leg nerve block done with Dr. Bogdan Ervin. Patient tolerated well.

## 2020-07-06 NOTE — PROGRESS NOTES
WHEN PT AWOKE, SHE BECAME NAUSEATED WITH DRY HEAVES AND COUGHING CLEAR PHLEGM; MEDICATED FOR NAUSEA; SEE MAR

## 2020-07-06 NOTE — ANESTHESIA PRE PROCEDURE
mg Oral TID Charisse Torres MD        lactated ringers infusion   Intravenous Continuous Jeet Moore  mL/hr at 20 0636      sodium chloride flush 0.9 % injection 10 mL  10 mL Intravenous 2 times per day Jeet Moore MD        sodium chloride flush 0.9 % injection 10 mL  10 mL Intravenous PRN Jeet Moore MD           Allergies: Allergies   Allergen Reactions    Percocet [Oxycodone-Acetaminophen] Nausea Only     Has tolerated IV Acetaminophen.  Vicodin [Hydrocodone-Acetaminophen] Nausea Only     Has tolerated IV Acetaminophen.  Morphine Nausea And Vomiting       Problem List:    Patient Active Problem List   Diagnosis Code    Old tear of lateral meniscus of right knee M23.200    Primary osteoarthritis of right knee M17.11    Right knee pain M25.561    Status post right partial knee replacement Z96.651    Scapular dyskinesis G25.89    Subacromial impingement of right shoulder M75.41    Rotator cuff tendonitis, right M75.81    Osteoarthritis of left knee M17.12       Past Medical History:        Diagnosis Date    Anxiety     Arthritis     Difficult intubation     pt states had problems with extubation with previous surgery    Gastroparesis     Hiatal hernia     PONV (postoperative nausea and vomiting)        Past Surgical History:        Procedure Laterality Date     SECTION      CHOLECYSTECTOMY      COLONOSCOPY      GALLBLADDER SURGERY      KNEE SURGERY      KNEE SURGERY Right 2017    KNEE SURGERY Right 2017    RIGHT KNEE UNICOMPARTMENTAL ARTHROPLASTY, LATERAL       Social History:    Social History     Tobacco Use    Smoking status: Current Every Day Smoker     Packs/day: 1.50    Smokeless tobacco: Never Used   Substance Use Topics    Alcohol use:  No                                Ready to quit: Not Answered  Counseling given: Not Answered      Vital Signs (Current):   Vitals:    20 1441 20 0622   BP:  124/86   Pulse:

## 2020-07-06 NOTE — OP NOTE
answered. Surgical Consent: The risks, the possible complications, the potential benefits of this procedure have been reviewed with the patient and have been fully understood. Patient has signed a consent to proceed with this procedure. By signing the consent the patient has accepted the risks and possible complications and agrees that all questions and concerns have been appropriately answered to the patients satisfaction. The patient was counseled at length about the risks of tori Covid-19 during their perioperative period and any recovery window from their procedure. The patient was made aware that tori Covid-19  may worsen their prognosis for recovering from their procedure  and lend to a higher morbidity and/or mortality risk. All material risks, benefits, and reasonable alternatives including postponing the procedure were discussed. The patient does wish to proceed with the procedure at this time. Procedure: The patient was met in the preop holding area where the operative limb was identified and appropriately marked with a skin pen. The appropriate antibiotic was verified. The patient was prepared by the nursing staff for the surgical procedure. The patient was also evaluated by the anesthesia staff and the preoperative regional blocks, as indicated, were placed by anesthesia. Patient was then transported to the surgical operating room. The patient was placed on the operating room table in the supine position. And the above-designated anesthetic was induced. Skin hair was clipped from the operative zone as needed. The skin was then prepared with antibacterial prep and sterile draping was performed. A final timeout was performed verifying the appropriate limb for surgery and also verifying that the preoperative prophylactic antibiotic of choice has been given based on the S.C.I.P. standards.     The extremity was exsanguinated and the pneumatic tourniquet was elevated. TThe anterior midline incision and lateral joint arthrotomy was then performed exposing the knee joint. A minimal incision and a dissection was performed throughout the procedure in an effort to reduce morbidity, but still have adequate exposure to safely perform the procedure under direct vision. 2 steinmann pins were placed 4 finger breaths above the patella and below the tibia tubercle, robotic arrays were attached the the pins and the leg was registered with the robotic system. Hoffmeister Leuchten robot was used to input the femoral and tibia anatomy into the computer identifying points within the lateral compartment and knee. Once all data was imputed the luis was inserted into the knee taking careful note of the depth of resection provided by the feedback from the NAVIO. Resection and peg placement for both femur and tibia was based on intraoperative templating from NAVIO. The femoral and tibial component was then placed in anatomical position for the patient and based on our intra-operative template. During the procedure all soft tissues as well as neurovascular structures were appropriately protected and monitored. Additional osteophytes were removed from bone as necessary. Copious antibiotic solution irrigation was performed frequently throughout the procedure. Electrocautery was used as necessary. Once all the femur and tibia surface was burred to the appropriate depth the trial components were placed. With the trial components in position, the knee was taken through a range of motion. The knee achieved full extension without instability, as well as full flexion. There were no signs of anterior, posterior, medial, or lateral instability. The trial components were then removed. Additional extensive irrigation was performed. The femur and tibia surfaces were then appropriately dried. Using tobramycin antibiotic cement all components were cemented into position.   The

## 2020-07-07 VITALS
SYSTOLIC BLOOD PRESSURE: 117 MMHG | OXYGEN SATURATION: 97 % | HEART RATE: 87 BPM | BODY MASS INDEX: 31.07 KG/M2 | DIASTOLIC BLOOD PRESSURE: 79 MMHG | RESPIRATION RATE: 18 BRPM | WEIGHT: 217 LBS | TEMPERATURE: 100 F | HEIGHT: 70 IN

## 2020-07-07 PROBLEM — Z96.652 STATUS POST LEFT PARTIAL KNEE REPLACEMENT: Status: ACTIVE | Noted: 2020-07-07

## 2020-07-07 LAB
HCT VFR BLD CALC: 32.4 % (ref 36–48)
HEMOGLOBIN: 10.3 G/DL (ref 12–16)
MCH RBC QN AUTO: 26.3 PG (ref 26–34)
MCHC RBC AUTO-ENTMCNC: 31.9 G/DL (ref 31–36)
MCV RBC AUTO: 82.5 FL (ref 80–100)
PDW BLD-RTO: 14.8 % (ref 12.4–15.4)
PLATELET # BLD: 229 K/UL (ref 135–450)
PMV BLD AUTO: 9.1 FL (ref 5–10.5)
RBC # BLD: 3.93 M/UL (ref 4–5.2)
WBC # BLD: 13.2 K/UL (ref 4–11)

## 2020-07-07 PROCEDURE — 85027 COMPLETE CBC AUTOMATED: CPT

## 2020-07-07 PROCEDURE — 6370000000 HC RX 637 (ALT 250 FOR IP): Performed by: ORTHOPAEDIC SURGERY

## 2020-07-07 PROCEDURE — 97110 THERAPEUTIC EXERCISES: CPT

## 2020-07-07 PROCEDURE — 6360000002 HC RX W HCPCS: Performed by: ORTHOPAEDIC SURGERY

## 2020-07-07 PROCEDURE — 2580000003 HC RX 258: Performed by: ORTHOPAEDIC SURGERY

## 2020-07-07 PROCEDURE — 36415 COLL VENOUS BLD VENIPUNCTURE: CPT

## 2020-07-07 PROCEDURE — 97530 THERAPEUTIC ACTIVITIES: CPT

## 2020-07-07 PROCEDURE — 97116 GAIT TRAINING THERAPY: CPT

## 2020-07-07 RX ORDER — OXYCODONE HYDROCHLORIDE 5 MG/1
5 TABLET ORAL EVERY 4 HOURS PRN
Qty: 18 TABLET | Refills: 0 | Status: SHIPPED | OUTPATIENT
Start: 2020-07-07 | End: 2020-07-10

## 2020-07-07 RX ORDER — SENNA AND DOCUSATE SODIUM 50; 8.6 MG/1; MG/1
1 TABLET, FILM COATED ORAL 2 TIMES DAILY
Qty: 30 TABLET | Refills: 0 | COMMUNITY
Start: 2020-07-07

## 2020-07-07 RX ADMIN — Medication 10 ML: at 08:40

## 2020-07-07 RX ADMIN — GABAPENTIN 300 MG: 300 CAPSULE ORAL at 08:39

## 2020-07-07 RX ADMIN — OXYCODONE HYDROCHLORIDE 5 MG: 5 TABLET ORAL at 08:39

## 2020-07-07 RX ADMIN — GABAPENTIN 300 MG: 300 CAPSULE ORAL at 13:23

## 2020-07-07 RX ADMIN — Medication 2 G: at 00:19

## 2020-07-07 RX ADMIN — SODIUM CHLORIDE: 9 INJECTION, SOLUTION INTRAVENOUS at 00:18

## 2020-07-07 RX ADMIN — METOCLOPRAMIDE 10 MG: 10 TABLET ORAL at 13:22

## 2020-07-07 RX ADMIN — SENNOSIDES AND DOCUSATE SODIUM 1 TABLET: 8.6; 5 TABLET ORAL at 08:39

## 2020-07-07 RX ADMIN — Medication 10 ML: at 00:19

## 2020-07-07 RX ADMIN — OXYCODONE HYDROCHLORIDE 5 MG: 5 TABLET ORAL at 13:23

## 2020-07-07 RX ADMIN — KETOROLAC TROMETHAMINE 30 MG: 30 INJECTION, SOLUTION INTRAMUSCULAR at 08:36

## 2020-07-07 RX ADMIN — ESCITALOPRAM OXALATE 20 MG: 10 TABLET ORAL at 08:39

## 2020-07-07 RX ADMIN — ACETAMINOPHEN 650 MG: 325 TABLET ORAL at 08:40

## 2020-07-07 RX ADMIN — METOCLOPRAMIDE 10 MG: 10 TABLET ORAL at 08:39

## 2020-07-07 RX ADMIN — PANTOPRAZOLE SODIUM 40 MG: 40 TABLET, DELAYED RELEASE ORAL at 06:27

## 2020-07-07 RX ADMIN — CELECOXIB 100 MG: 100 CAPSULE ORAL at 08:39

## 2020-07-07 RX ADMIN — ONDANSETRON HYDROCHLORIDE 4 MG: 2 INJECTION, SOLUTION INTRAMUSCULAR; INTRAVENOUS at 05:40

## 2020-07-07 RX ADMIN — OXYCODONE HYDROCHLORIDE 5 MG: 5 TABLET ORAL at 04:57

## 2020-07-07 RX ADMIN — ACETAMINOPHEN 650 MG: 325 TABLET ORAL at 04:53

## 2020-07-07 ASSESSMENT — PAIN DESCRIPTION - LOCATION: LOCATION: KNEE

## 2020-07-07 ASSESSMENT — PAIN SCALES - GENERAL
PAINLEVEL_OUTOF10: 7
PAINLEVEL_OUTOF10: 7
PAINLEVEL_OUTOF10: 5
PAINLEVEL_OUTOF10: 0
PAINLEVEL_OUTOF10: 7
PAINLEVEL_OUTOF10: 6

## 2020-07-07 ASSESSMENT — PAIN DESCRIPTION - DESCRIPTORS: DESCRIPTORS: ACHING;THROBBING

## 2020-07-07 ASSESSMENT — PAIN DESCRIPTION - PAIN TYPE: TYPE: SURGICAL PAIN

## 2020-07-07 ASSESSMENT — PAIN DESCRIPTION - ORIENTATION: ORIENTATION: LEFT

## 2020-07-07 NOTE — FLOWSHEET NOTE
07/07/20 0453   Vital Signs   Patient Currently in Pain Yes   Pain Assessment   Pain Assessment 0-10   Pain Level 6   Patient's Stated Pain Goal 4   Pain Type Surgical pain   Pain Location Knee   Pain Orientation Left   Pain Descriptors Aching; Throbbing   Non-Pharmaceutical Pain Intervention(s) Cold applied     PRN oxycodone give. Applied ice pack to left knee.

## 2020-07-07 NOTE — CARE COORDINATION
Review of chart screened for potential discharge planning needs. Contact with _[patient____(pt/support person)   Role of discharge planner explained with verbalized understanding. IPTA will DC to mothers single floor apartment @ [address] [facility] [address], [address] [facility].. Will Dc in care of mother later today. Pt has a RW and PT added a wheel kit from [facility]. No Needs identified by pt/support person for barriers to discharge. Readmission Risk Score: NA  No discharge needs noted not following. MD and bedside RN please notify CM if needs arise for any discharge interventions. DISCHARGE ORDER  Date/Time 2020 12:59 PM  Completed by: Alan Diego, Case Management    Patient Name: Magdalene Recinos    : 1980  Admitting Diagnosis: Osteoarthritis of right knee, unspecified osteoarthritis type [M17.11]      Admit order Date and Status: OP in bed  (verify MD's last order for status of admission)      Noted discharge order. Confirmed discharge plan: patient    Discharge Plan: DC home with RW (wheel kit) from [facility] with [address] +approved to begin therapy per Alec (intake). HD will contact pt to start care. Referral and clinicals faxed to [facility]. Reviewed chart. Role of discharge planner explained and patient verbalized understanding. Discharge order is noted. Has Home O2 in place on admit:  No    Pt is being d/c'd to in care of mother to her first floor apartment today. +[facility]. Pt's O2 sats are 97% on RA. Discharge timeout done with [facility]. All discharge needs and concerns addressed.

## 2020-07-07 NOTE — PROGRESS NOTES
Inpatient Physical Therapy Daily Treatment Note    Unit: [address]  Date:  2020  Patient Name:    Nadine Villasenor  Admitting diagnosis:  Osteoarthritis of right knee, unspecified osteoarthritis type [M17.11]  Admit Date:  2020  Precautions/Restrictions:  Fall risk, Bed/chair alarm, Lines -IV, WB Restrictions (full WBAT) and Knee immobilizer      Discharge Recommendations: Home 24 hr assist  and Home PT (likely good candidate for OP PT if she would like)  DME needs for discharge: Needs Met - family to bring in walker tomorrow for therapy session     Therapy recommendation for EMS Transport: can transport by wheelchair    Therapy recommendations for staff:   Stand by assist with use of rolling walker (RW), knee immobilizer and L side KI for all transfers and ambulation to/from bathroom  within halls    History of Present Illness: 42 yo female admitted to [facility] after L partial knee arthroplasty (lateral compartment only) on 20. Full WBAT, knee immobilizer on (d/c once able to complete 10 SLR indep)    Home Health S4 Level Recommendation: Level 1 Standard  AM-PAC Mobility Score   AM-PAC Inpatient Mobility Raw Score : 22     Treatment Time: 948  Treatment number: 2  Timed Code Treatment Minutes: 42 minutes  Total Treatment Minutes:  42  minutes    Cognition    A&O x4   Able to follow 2 step commands    Subjective  Patient lying supine in bed with no family present   Pt agreeable to this PT tx.      Pain   Yes  Location: R knee  Ratin/10  Pain Medicine Status: Received pain med prior to tx     Bed Mobility   Supine to Sit:    Modified Independent  Sit to Supine:   Modified Independent  Rolling:   Not Tested  Scooting:   Modified Independent    Transfer Training     Sit to stand:   Supervision  Stand to sit:   Supervision  Bed to Chair:   Supervision with use of rolling walker (RW) and knee immobilizer on left    Gait Training gait completed as indicated below  Distance:      150 ft  Deviations (firm surface/linoleum):  decreased joan, increased STEPHEN, step to pattern, antalgic pattern, decreased step length on left and decreased stance time on left  Assistive Device Used:    rolling walker (RW) and knee immobilizer on left  Level of Assist:    SBA  Comment: Patient     Stair Training deferred, pt does not have stairs in the home environment    Therapeutic Exercise all completed bilaterally unless indicated  Ankle Pumps x 10 reps  Glut sets x 10 reps  Quad sets x 10 reps  Heel slides x 10 reps with manual assist on the LLE  LAQ x 10 reps with manual assist on the LLE  SLR x 10 reps with manual assist on the LLE  marching x 10 reps  Hip abduction: x 10 reps with manual assist on the LLE  Hip adduction/pillow squeeze: x 10 reps    Balance  Sitting:  Normal; Independent  Comments:     Standing: Fair +; Supervision  Comments: 10 min    Patient Education      Role of PT, POC, Discharge recommendations, DC recommendations, safety awareness, transfer techniques, pursed lip breathing, energy conservation, pacing activity and calling for assist with mobility. Positioning Needs       Pt up in chair, alarm set, positioned in proper neutral alignment and pressure relief provided. Call light provided and all needs within reach    ROM Measurements N/A  Knee Flexion: 0 to 80 degrees  Knee Extension: 0 degrees    Activity Tolerance   Pt completed therapy session with Pain noted with L knee bending and ambulation. Other  N/A    Assessment :  Patient performed well today and modified independent in bed mobility and functional transfers as well as donning and doffing of the L KI. Patient was able to demonstrate all her HEP exercises as shown in the packet provided to her today. Recommending Home 24 hr assist and with home PT upon discharge as patient functioning below baseline level and would benefit from continued therapy services due to s/p partial TKA this date.      Goals (all goals ongoing unless otherwise

## 2020-07-07 NOTE — DISCHARGE SUMMARY
Department of Orthopedic Surgery  Nurse Practitioner   Discharge Summary    The Bro Whitman is a 41 y.o. female underwent partial knee replacement procedure without complication. Bro Whitman was admitted to the floor following Her recovery in the PACU. Discharge Diagnosis  left Knee Replacement    Current Inpatient Medications    Current Facility-Administered Medications: celecoxib (CELEBREX) capsule 100 mg, 100 mg, Oral, BID  gabapentin (NEURONTIN) capsule 300 mg, 300 mg, Oral, TID  pantoprazole (PROTONIX) tablet 40 mg, 40 mg, Oral, QAM AC  amitriptyline (ELAVIL) tablet 10 mg, 10 mg, Oral, Nightly PRN  escitalopram (LEXAPRO) tablet 20 mg, 20 mg, Oral, Daily  hydrOXYzine (VISTARIL) capsule 25 mg, 25 mg, Oral, TID PRN  traZODone (DESYREL) tablet 50 mg, 50 mg, Oral, Nightly  metoclopramide (REGLAN) tablet 10 mg, 10 mg, Oral, 4x Daily  0.9 % sodium chloride infusion, , Intravenous, Continuous  sodium chloride flush 0.9 % injection 10 mL, 10 mL, Intravenous, 2 times per day  sodium chloride flush 0.9 % injection 10 mL, 10 mL, Intravenous, PRN  acetaminophen (TYLENOL) tablet 650 mg, 650 mg, Oral, Q6H  HYDROmorphone (DILAUDID) injection 0.25 mg, 0.25 mg, Intravenous, Q3H PRN **OR** HYDROmorphone (DILAUDID) injection 0.5 mg, 0.5 mg, Intravenous, Q3H PRN  sennosides-docusate sodium (SENOKOT-S) 8.6-50 MG tablet 1 tablet, 1 tablet, Oral, BID  magnesium hydroxide (MILK OF MAGNESIA) 400 MG/5ML suspension 30 mL, 30 mL, Oral, Daily PRN  oxyCODONE (ROXICODONE) immediate release tablet 5 mg, 5 mg, Oral, Q4H PRN  promethazine (PHENERGAN) tablet 12.5 mg, 12.5 mg, Oral, Q6H PRN **OR** ondansetron (ZOFRAN) injection 4 mg, 4 mg, Intravenous, Q6H PRN  enoxaparin (LOVENOX) injection 40 mg, 40 mg, Subcutaneous, Daily  ketorolac (TORADOL) injection 30 mg, 30 mg, Intravenous, Q6H PRN    Post-operatively the patients diet was advanced as tolerated and their incision was checked on POD #1.   The incision is dressing in place, clean, dry and intact with no signs of infection. The patient remained neurovascularly intact in the lower extremity and had intact pulses distally. Patients calf remained soft and showed no evidence of DVT. The patient was able to move their leg and ankle/foot without any problems post-operatively. Physical therapy and occupational therapy were consulted and began working with the patient post-operatively. The patient progressed with PT/OT as would be expected and continued to improve through their stay. The patients pain was initially controlled with IV medications but we were able to transition to oral pain medications soon after arrival to the floor and their pain remained under good control through their hospital stay. From a medical standpoint the patient remained stable and continued to have the medicine team follow throughout their stay. The patients dressing was changed/incison was checked on day of d/c. The patient will be discharged at this time to Home  with their current diet restrictions and will continue to follow the total knee precautions outlined to them by us and PT/OT. Condition on Discharge: Stable    Plan  Return visit in 2 weeks. .  Patient was instructed on the use of pain medications, the signs and symptoms of infection, and was given our number to call should they have any questions or concerns following discharge. For opioid prescriptions given at discharge the following statement is provided for compliance with [employer]SMB rules. Patient being given increased dosage/quantity of opoid pain medication in excess of OSMB guidelines which noted a 30 MED daily of opioids due to the fact that he/she has undergone major orthopaedic surgery as outlined in rule 4731-11-13. Dosages and further duration of the pain medication will be re-evaluated at her post op visit in 2 weeks.   Patient was educated on dosing expectations and limits of prescribing as a result of the [facility] Board rules enacted August 31, 2017. Please also note that this is not the initial opoid prescription issued to this patient but a continuation of medication utilized during the hospital admission as noted in the medical record. [place]ARRS report has also been utilized to screen for any abuse history or suspicious activity as outlined in [place]. All efforts have been taken to prevent abuse potential and misuse of opioid medications including education, screening, and close clinical follow up.

## 2020-07-07 NOTE — PROGRESS NOTES
Department of Orthopedic Surgery  Attending   Progress Note    Subjective:     Post-Operative Day: 1 Status Post left lateral compartment knee Arthroplasty  Systemic or Specific Complaints:No Complaints and did well with therapy yesterday    Objective:     Patient Vitals for the past 24 hrs:   BP Temp Temp src Pulse Resp SpO2   07/07/20 0732 108/72 97.8 °F (36.6 °C) Oral 80 18 97 %   07/07/20 0503 108/70 97.4 °F (36.3 °C) Oral 82 18 97 %   07/07/20 0016 117/74 97.5 °F (36.4 °C) Oral 79 18 98 %   07/06/20 2027 105/60 98.5 °F (36.9 °C) Oral 90 18 97 %   07/06/20 1644 115/72 98.5 °F (36.9 °C) Oral 85 18 100 %   07/06/20 1606 -- 97 °F (36.1 °C) Oral 77 18 98 %   07/06/20 1545 134/87 -- -- 85 -- --   07/06/20 1438 108/73 97 °F (36.1 °C) Oral 86 18 98 %   07/06/20 1400 120/84 98.4 °F (36.9 °C) Temporal 81 17 100 %   07/06/20 1330 103/69 -- -- 76 14 95 %   07/06/20 1315 118/76 -- -- 75 14 95 %   07/06/20 1300 115/75 -- -- 76 15 95 %   07/06/20 1245 122/85 -- -- 78 14 94 %   07/06/20 1230 115/78 -- -- 77 14 96 %   07/06/20 1200 133/86 -- -- 92 23 96 %   07/06/20 1145 125/82 -- -- 92 18 99 %   07/06/20 1130 (!) 130/92 -- -- 90 14 96 %   07/06/20 1115 128/79 -- -- 82 18 97 %   07/06/20 1100 (!) 130/90 -- -- 95 21 96 %   07/06/20 1045 (!) 125/91 -- -- 98 15 95 %   07/06/20 1037 128/89 -- -- 99 18 92 %   07/06/20 1032 124/80 -- -- 92 17 95 %   07/06/20 1027 121/80 -- -- 97 12 96 %   07/06/20 1022 121/83 97.1 °F (36.2 °C) Temporal 99 20 97 %       General: alert, appears stated age and cooperative   Wound: Wound clean and dry no evidence of infection. , No Erythema, No Drainage and Positive for Edema   Motion: Painful range of Motion   DVT Exam: No evidence of DVT seen on physical exam.  Negative Daniela's sign. No significant calf/ankle edema. Knee swollen but thigh soft to palpation. Moving foot and ankle. Good distal pulses.       Data Review  CBC:   Lab Results   Component Value Date    WBC 13.2 07/07/2020    RBC 3.93 07/07/2020    HGB 10.3 07/07/2020    HCT 32.4 07/07/2020     07/07/2020       Assessment:     Status Post left lateral compartment knee Arthroplasty. Doing well postoperatively. Plan:      1: Continues current post-op course : Plan for discharge later today after physical therapy. Follow-up in 2 weeks at scheduled appointment.   2:  Continue Deep venous thrombosis prophylaxis  3:  Continue physical therapy  4:  Continue Pain Control

## 2020-07-07 NOTE — FLOWSHEET NOTE
07/06/20 2027   Vital Signs   Temp 98.5 °F (36.9 °C)   Temp Source Oral   Pulse 90   Heart Rate Source Monitor   Resp 18   /60   BP Location Left upper arm   Patient Position Up in chair   Level of Consciousness 0   MEWS Score 1   Patient Currently in Pain Yes  (satisfied)   Pain Assessment   Pain Assessment 0-10   Pain Level 3   Response to Pain Intervention Patient Satisfied   Oxygen Therapy   SpO2 97 %   O2 Device None (Room air)     Pt A/Ox4. Shift assessment complete. See flowsheet. PM meds given. See MAR. Pt up in chair, no distress noted. Pt denies bathroom needs. Pt will call for assistance. Ice therapy applied. Chair wheels locked. Call light within reach. Will continue monitor.

## 2020-07-07 NOTE — DISCHARGE INSTR - COC
Continuity of Care Form    Patient Name: Jose Pace   :  1980  MRN:  [id number]    Admit date:  2020  Discharge date:  2020    Code Status Order: Full Code   Advance Directives:   [facility] Documentation     Date/Time Healthcare Directive Type of Healthcare Directive Copy in [address] [facility] [address] Agent's Name Healthcare Agent's Phone Number    20 0229  No, patient does not have an advance directive for healthcare treatment -- -- -- -- --    20 1443  No, patient does not have an advance directive for healthcare treatment -- -- -- -- --          Admitting Physician:  Hugo Muro DO  PCP: JULIANNE Muller CNP    Discharging Nurse: [facility] Unit/Room#: 8147/3428-83  Discharging Unit Phone Number: ***    Emergency Contact:   Extended Emergency Contact Information  Primary Emergency Contact: [address] [place] [address] Phone: [phone]  Relation: Spouse  Secondary Emergency Contact: Amber Valenzuela  Address: [place] [address], [address] [facility] [address] [facility]  Home Phone: [phone]  Work Phone: [phone]  Relation: Parent    Past Surgical History:  Past Surgical History:   Procedure Laterality Date     SECTION      CHOLECYSTECTOMY      COLONOSCOPY      GALLBLADDER SURGERY      KNEE SURGERY      KNEE SURGERY Right 2017    KNEE SURGERY Right 2017    RIGHT KNEE UNICOMPARTMENTAL ARTHROPLASTY, LATERAL    KNEE SURGERY Left 2020    LEFT KNEE LATERAL COMPARTMENT UNIARTHROPLASTY       Immunization History: There is no immunization history on file for this patient.     Active Problems:  Patient Active Problem List   Diagnosis Code    Old tear of lateral meniscus of right knee M23.200    Primary osteoarthritis of right knee M17.11    Right knee pain M25.561    Status post right partial knee replacement Z96.651    Scapular dyskinesis G25.89    Subacromial Concerns:     [address] Safety Concerns:[id number]}    Impairments/Disabilities:      [address] Impairments/Disabilities:828482225}    Nutrition Therapy:  Current Nutrition Therapy:   [address] MOLLY Diet List:308988924}    Routes of Feeding: {CHP DME Other Feedings:446277954}  Liquids: {Slp liquid thickness:81616}  Daily Fluid Restriction: {CHP DME Yes amt example:727892686}  Last Modified Barium Swallow with Video (Video Swallowing Test): {Done Not Done ICFZ:862397917}    Treatments at the Time of Hospital Discharge:   Respiratory Treatments: ***  Oxygen Therapy:  {Therapy; copd oxygen:68706}  Ventilator:    {[facility] Vent BOIK:184121255}    Rehab Therapies: {THERAPEUTIC INTERVENTION:4306209286}  Weight Bearing Status/Restrictions: [address]  Weight Bearin}  Other Medical Equipment (for information only, NOT a DME order):  {EQUIPMENT:847711136}  Other Treatments: ***    Patient's personal belongings (please select all that are sent with patient):  {CHP DME Belongings:713842683}    RN SIGNATURE:  {Esignature:566980237}    CASE MANAGEMENT/SOCIAL WORK SECTION    Inpatient Status Date: Out patient in a bed    Readmission Risk Assessment Score:  Readmission Risk              Risk of Unplanned Readmission:        0           Discharging to Facility/ Agency   · Name: [facility]  · Address:  · Phone: [phone]  · Fax: [phone]    Dialysis Facility (if applicable)   · Name:  · Address:  · Dialysis Schedule:  · Phone:  · Fax:    / signature: Electronically signed by Mario Cortes RN on 20 at 12:51 PM EDT    PHYSICIAN SECTION    Prognosis: {Prognosis:7469707577}    Condition at Discharge: [address] Patient Condition:527852282}    Rehab Potential (if transferring to Rehab): Good    Recommended Labs or Other Treatments After Discharge:     Physician Certification: I certify the above information and transfer of Falguniyn Spells  is necessary for the continuing treatment of the diagnosis listed and that she requires Home Care for less 30 days.      Update Admission H&P: Changes in H&P as follows - see attached    PHYSICIAN SIGNATURE: Dr. Momo Ruiz MD/  Electronically signed by Addison Almodovar RN on 7/7/20 at 12:52 PM EDT

## 2020-07-07 NOTE — PROGRESS NOTES
Occupational Therapy Daily Treatment Note    Unit: [facility]  Date:  7/7/2020  Patient Name:    Sarthak Mcconnell  Admitting diagnosis:  Osteoarthritis of right knee, unspecified osteoarthritis type [M17.11]  Admit Date:  7/6/2020  Precautions/Restrictions:  fall risk, IV, bed/chair alarm, knee immobilizer and WB restrictions (FWB)         Discharge Recommendations: Home with 24/7 assist and home therapy  DME needs for discharge: Needs Met       Therapy recommendations for staff:   Assist of 1 (contact guard assist) with use of rolling walker (RW) for all ambulation to/from bathroom    AM-PAC Score: AM-PAC Inpatient Daily Activity Raw Score: 21  Home Health S4 Level: Level 1- Standard       Treatment Time:  10:48-11:13  Treatment number:  2   Total Treatment Time:  25 minutes    History of Present Illness: 41 y.o female admitted for elective knee sx. 7/6 LEFT KNEE LATERAL COMPARTMENT UNIARTHROPLASTY. PmHx: anxiety, arthritis, gastroparesis, hiatal hernia, Right knee sx on 8/2017    Subjective:  Patient reclined in chair and agreeable to treatment. Patient requesting wheels for her standard walker before she goes home today. Therapist retrieved wheel kit for patient. No problems or concerns with installing wheels. Pain   No  Rating: NA  Location:  Pain Medicine Status: Denies need      Bed Mobility:   Supine to Sit:  Not Tested  Sit to Supine:  Not Tested  Rolling:           Not Tested  Scooting:        Independent    Transfer Training:   Sit to stand:   SBA  Stand to sit:  SBA  Bed to Chair:  Not Tested  Bed to [facility]:   Not Tested  Standard toilet:   Not Tested     Completed functional mobility across room x2 with SBA and use of RW.     Activity Tolerance   Pt completed therapy session with No adverse symptoms noted w/activity  SpO2:   HR:   BP:     ADL Training:   Upper body dressing:  Not Tested  Upper body bathing:  Not Tested  Lower body dressing:  SBA  Lower body bathing:  SBA  Toileting:   Not Tested  Grooming/Hygiene:  Not Tested    Therapeutic Exercise:   N/A    Patient Education:   Role of OT  Recommendations for DC    Positioning Needs:   Up in chair, call light and needs in reach. Alarm Set    Family Present:  Yes    Assessment: Patient demonstrated SBA-supervision for all ADLs and mobility. Patient is safe to return home with assistance from family. GOALS  To be met in 3 Visits:  1). Bed to toilet/BSC: Modified Independent     To be met in 5 Visits:  2). Upper Body Bathing:         Modified Independent  3). Lower Body Bathing:         Modified Independent  4). Upper Body Dressing:       Modified Independent  5).  Lower Body Dressing:       Modified Independent    Plan: cont with [facility], OTR/L #[id number]    If patient discharges from this facility prior to next visit, this note will serve as the Discharge Summary

## 2020-07-07 NOTE — PLAN OF CARE
Problem: SAFETY  Goal: Free from accidental physical injury  Outcome: Ongoing     Problem: PAIN  Goal: Patient's pain/discomfort is manageable  Outcome: Ongoing     Problem: KNOWLEDGE DEFICIT  Goal: Patient/S.O. demonstrates understanding of disease process, treatment plan, medications, and discharge instructions.   Outcome: Ongoing     Problem: Pain:  Goal: Pain level will decrease  Description: Pain level will decrease  Outcome: Ongoing  Goal: Control of acute pain  Description: Control of acute pain  Outcome: Ongoing  Goal: Control of chronic pain  Description: Control of chronic pain  Outcome: Ongoing     Problem: Falls - Risk of:  Goal: Will remain free from falls  Description: Will remain free from falls  Outcome: Ongoing  Goal: Absence of physical injury  Description: Absence of physical injury  Outcome: Ongoing

## 2020-07-08 ENCOUNTER — TELEPHONE (OUTPATIENT)
Dept: ORTHOPEDIC SURGERY | Age: 40
End: 2020-07-08

## 2020-07-08 NOTE — TELEPHONE ENCOUNTER
Spoke with pt, doing fine. Incision status: No drainage or redness    Edema/Swelling/Teds: Jennifer swelling, hasn't been wearing TEDS. Educated on use of TEDS, has been icing and elevating. Pain level and status: Managing ok. Use of pain medications: Using gladys and tylenol in between. Blood thinner: Lovenox injections with no issues. Bowels: Using stool softener. Home Care Agency active: [facility] with RN and PT.      Outpatient therapy: NA    Do you have all of your medications: Yes    Changes in medications: no    Ortho Vitals: NA    Follow up appointments:    Future Appointments   Date Time Provider Drake Griggs   7/21/2020  9:50 AM Tara Whipple, [facility]

## 2020-07-13 RX ORDER — OXYCODONE HYDROCHLORIDE 5 MG/1
5 TABLET ORAL EVERY 6 HOURS PRN
Qty: 28 TABLET | Refills: 0 | Status: SHIPPED | OUTPATIENT
Start: 2020-07-13 | End: 2020-07-16

## 2020-07-15 ENCOUNTER — TELEPHONE (OUTPATIENT)
Dept: ORTHOPEDIC SURGERY | Age: 40
End: 2020-07-15

## 2020-07-15 NOTE — TELEPHONE ENCOUNTER
Spoke with daughter, Pt was working with PT at time of call. Doing really well, no questions or concerns at this time.         Follow up appointments:    Future Appointments   Date Time Provider Drake Griggs   7/21/2020  9:50 AM Андрей Lopez, [facility]

## 2020-07-21 ENCOUNTER — OFFICE VISIT (OUTPATIENT)
Dept: ORTHOPEDIC SURGERY | Age: 40
End: 2020-07-21

## 2020-07-21 VITALS — BODY MASS INDEX: 31.07 KG/M2 | HEIGHT: 70 IN | WEIGHT: 217 LBS

## 2020-07-21 PROCEDURE — 99024 POSTOP FOLLOW-UP VISIT: CPT | Performed by: ORTHOPAEDIC SURGERY

## 2020-07-21 RX ORDER — HYDROCODONE BITARTRATE AND ACETAMINOPHEN 5; 325 MG/1; MG/1
1 TABLET ORAL EVERY 6 HOURS PRN
Qty: 28 TABLET | Refills: 0 | Status: SHIPPED | OUTPATIENT
Start: 2020-07-21 | End: 2020-07-28

## 2020-07-21 NOTE — PROGRESS NOTES
Procedures:  Orders Placed This Encounter   Procedures    XR KNEE LEFT (1-2 VIEWS)    Ambulatory referral to Physical Therapy     Referral Priority:   Routine     Referral Type:   Eval and Treat     Referral Reason:   Specialty Services Required     Requested Specialty:   Physical Therapy     Number of Visits Requested:   1       Treatment Plan:    Continue with home health PT, transition to out patient PT  Pain medication was refilled as needed  Follow-up in 4 weeks for range of motion check, no x-rays required at next visit        This dictation was performed with a verbal recognition program (DRAGON) and it was checked for errors. It is possible that there are still dictated errors within this office note. If so, please bring any errors to my attention for an addendum. All efforts were made to ensure that this office note is accurate.

## 2020-07-27 ENCOUNTER — TELEPHONE (OUTPATIENT)
Dept: ORTHOPEDIC SURGERY | Age: 40
End: 2020-07-27

## 2020-08-25 ENCOUNTER — OFFICE VISIT (OUTPATIENT)
Dept: ORTHOPEDIC SURGERY | Age: 40
End: 2020-08-25

## 2020-08-25 VITALS — BODY MASS INDEX: 31.07 KG/M2 | HEIGHT: 70 IN | WEIGHT: 217 LBS

## 2020-08-25 PROCEDURE — 99024 POSTOP FOLLOW-UP VISIT: CPT | Performed by: ORTHOPAEDIC SURGERY

## 2020-08-25 NOTE — PROGRESS NOTES
Chief Complaint:  Follow-up (check left knee sx: 7/6/2020, left knee partial knee replacement)      SUBJECTIVE:  Cookie Robert is a 41 y.o. female who returns today for 6-week follow-up, she is status post a left lateral compartment arthroplasty  Amy Snyder). She is doing well, has 4 out of 10 achy pain in the quadricep. Is progressing with physical therapy on her own. She has no complaints at this time. She is ambulating without any assistive devices. Date of surgery: 7/6/20    Pain Assessment:  Pain Assessment  Location of Pain: Knee  Location Modifiers: Left  Severity of Pain: 4  Quality of Pain: Aching  Duration of Pain: Persistent  Frequency of Pain: Intermittent  Aggravating Factors: Bending, Stretching, Straightening, Standing, Walking, Stairs  Limiting Behavior: No  Relieving Factors: Rest  Result of Injury: No  Work-Related Injury: No  Are there other pain locations you wish to document?: No      Medical History:  Patient's medications, allergies, past medical, surgical, social and family histories were reviewed and updated as appropriate. Review of Systems:  Constitutional: negative  Respiratory: negative  Cardiovascular: negative  Musculoskeletal:negative except for Follow-up (check left knee sx: 7/6/2020, left knee partial knee replacement)    Relevant review of systems reviewed and available in the patient's chart in media tab    General Exam:   Constitutional: Patient is adequately groomed with no evidence of malnutrition  Mental Status: The patient is oriented to time, place and person. The patient's mood and affect are appropriate. Vascular: Examination reveals no swelling or calf tenderness. Peripheral pulses are palpable and 2+. OBJECTIVE:  Vital Signs:  Vitals:    08/25/20 1316   Weight: 217 lb (98.4 kg)   Height: 5' 10\" (1.778 m)       Appearance: alert, well appearing, and in no distress, oriented to person, place, and time and normal appearing weight.     Physical exam:   Incision is well-healed, has range of motion 0 to 135 degrees. Knee is stable with anterior posterior drawer, varus and valgus stress. Distal neurovascular exam is intact. 4+ out of 5 strength with flexion extension. Ambulates with a normal gait and no assistive devices. Assessment : 6 weeks status post left partial knee replacement lateral compartment. Doing well    Impression:  Encounter Diagnoses   Name Primary?  Primary osteoarthritis of left knee Yes    Status post left partial knee replacement        Office Procedures:  No orders of the defined types were placed in this encounter. No orders of the defined types were placed in this encounter. Treatment Plan: Continue to progress with physical therapy, continue to advance activities as tolerated. Follow-up in 2 months for AP and lateral x-ray of the left knee. Patient agrees with this plan, all of their questions were answered best of our ability and to their satisfaction.       [facility]

## 2020-10-27 ENCOUNTER — OFFICE VISIT (OUTPATIENT)
Dept: ORTHOPEDIC SURGERY | Age: 40
End: 2020-10-27
Payer: COMMERCIAL

## 2020-10-27 VITALS — WEIGHT: 217 LBS | HEIGHT: 70 IN | BODY MASS INDEX: 31.07 KG/M2

## 2020-10-27 PROCEDURE — L1810 KO ELASTIC WITH JOINTS: HCPCS | Performed by: ORTHOPAEDIC SURGERY

## 2020-10-27 PROCEDURE — 4004F PT TOBACCO SCREEN RCVD TLK: CPT | Performed by: ORTHOPAEDIC SURGERY

## 2020-10-27 PROCEDURE — G8427 DOCREV CUR MEDS BY ELIG CLIN: HCPCS | Performed by: ORTHOPAEDIC SURGERY

## 2020-10-27 PROCEDURE — 99213 OFFICE O/P EST LOW 20 MIN: CPT | Performed by: ORTHOPAEDIC SURGERY

## 2020-10-27 PROCEDURE — G8417 CALC BMI ABV UP PARAM F/U: HCPCS | Performed by: ORTHOPAEDIC SURGERY

## 2020-10-27 PROCEDURE — G8484 FLU IMMUNIZE NO ADMIN: HCPCS | Performed by: ORTHOPAEDIC SURGERY

## 2020-10-27 RX ORDER — METHYLPREDNISOLONE 4 MG/1
TABLET ORAL
Qty: 1 KIT | Refills: 0 | Status: SHIPPED | OUTPATIENT
Start: 2020-10-27

## 2020-10-27 NOTE — PROGRESS NOTES
Chief Complaint:  Post-Op Check (check left knee, s/p left partial knee replacement)      SUBJECTIVE:  Loraine Stringer is a 41 y.o. female who returns today for 3 month follow-up, she is status post a left lateral compartment arthroplasty  Dileep Cole). States she has had increased anterior pain with ambulation, pain improves with rest.  Pain is anterior at the proximal portion of her incision and quadricep. She denies lateral sided pain. She is ambulating without any assistive devices. Currently 6 out of 10 discomfort. Date of surgery: 7/6/20    Pain Assessment:         Medical History:  Patient's medications, allergies, past medical, surgical, social and family histories were reviewed and updated as appropriate. Review of Systems:  Constitutional: negative  Respiratory: negative  Cardiovascular: negative  Musculoskeletal:negative except for Post-Op Check (check left knee, s/p left partial knee replacement)    Relevant review of systems reviewed and available in the patient's chart in media tab    General Exam:   Constitutional: Patient is adequately groomed with no evidence of malnutrition  Mental Status: The patient is oriented to time, place and person. The patient's mood and affect are appropriate. Vascular: Examination reveals no swelling or calf tenderness. Peripheral pulses are palpable and 2+. OBJECTIVE:  Vital Signs:  Vitals:    10/27/20 1306   Weight: 217 lb (98.4 kg)   Height: 5' 10\" (1.778 m)       Appearance: alert, well appearing, and in no distress, oriented to person, place, and time and normal appearing weight. Physical exam:   Incision is well-healed, range of motion 0 to 135 degrees. She has no tenderness to palpation at the anterior aspect of range of motion or quadricep. She has 5 out of 5 strength with flexion and extension and no pain while doing a straight leg raise or extension against resistance. She ambulates with a normal gait and no assistive devices.   Distal neurovascular exam is intact. She does have crepitus under the patella with range of motion bilaterally. X-ray: 2 views of the left knee are obtained and reviewed they show an appropriately placed lateral compartment uniarthroplasty with no evidence of loosening or migration    Assessment : 3 months status post left partial knee replacement lateral compartment. Impression:  Encounter Diagnosis   Name Primary?  Status post left partial knee replacement Yes       Office Procedures:  Orders Placed This Encounter   Procedures    XR KNEE LEFT (1-2 VIEWS)    Breg Hinged Lateral Stabilizer Knee Brace     Patient was prescribed a Breg Hinged Lateral Stabilizer. The left knee will require stabilization / immobilization from this semi-rigid / rigid orthosis to improve their function. The orthosis will assist in protecting the affected area, provide functional support and facilitate healing. The patient was educated and fit by a healthcare professional with expert knowledge and specialization in brace application while under the direct supervision of the physician. Verbal and written instructions for the use of and application of this item were provided. They were instructed to contact the office immediately should the brace result in increased pain, decreased sensation, increased swelling or worsening of the condition. Procedures    Breg Hinged Lateral Stabilizer Knee Brace     Patient was prescribed a Breg Hinged Lateral Stabilizer. The left knee will require stabilization / immobilization from this semi-rigid / rigid orthosis to improve their function. The orthosis will assist in protecting the affected area, provide functional support and facilitate healing. The patient was educated and fit by a healthcare professional with expert knowledge and specialization in brace application while under the direct supervision of the physician.   Verbal and written instructions for the use of and application of this item were provided. They were instructed to contact the office immediately should the brace result in increased pain, decreased sensation, increased swelling or worsening of the condition. Treatment Plan: Anterior pain may be associated with patellofemoral aggravation during rehab. I provided a Medrol Dosepak, she will continue to modify activities as necessary. Follow-up in 1 to 2 months for reevaluation. Patient agrees with this plan, all of their questions were answered best of our ability and to their satisfaction.       Zan Love

## 2025-06-10 ENCOUNTER — TELEPHONE (OUTPATIENT)
Dept: ORTHOPEDIC SURGERY | Age: 45
End: 2025-06-10

## 2025-06-10 NOTE — TELEPHONE ENCOUNTER
Working on medical records request for disability.  Waiting to see how patient would like to retrieve her records.

## (undated) DEVICE — Z DISCONTINUED PER MEDLINE USE 2752023 DRESSING FOAM W7.62XL7.62CM SIL ADH WTRPRF FLM BK SQ SHP

## (undated) DEVICE — SUTURE MCRYL + SZ 4-0 L18IN ABSRB UD L19MM PS-2 3/8 CIR MCP496G

## (undated) DEVICE — SMARTGOWN SURGICAL GOWN, XL: Brand: CONVERTORS

## (undated) DEVICE — CATHETER IV 14GA L5.25IN PERIPH ORNG FEP POLYMER 3 BVL

## (undated) DEVICE — Device

## (undated) DEVICE — 60 ML SYRINGE REGULAR TIP: Brand: MONOJECT

## (undated) DEVICE — 3 BONE CEMENT MIXER: Brand: MIXEVAC

## (undated) DEVICE — COMPONENT KNEE LOWER EXTREMITIES. ANCIL ZIRCONIUM NAVIO DISP

## (undated) DEVICE — GLOVE ORANGE PI 7 1/2   MSG9075

## (undated) DEVICE — TUBE SUCT L10IN MINI FLTR DISP KAMVAC

## (undated) DEVICE — ESMARK: Brand: DEROYAL

## (undated) DEVICE — Z DUP USE 2701075 SYSTEM SKIN CLSR 42CM DERMBND PRINEO

## (undated) DEVICE — 3M™ IOBAN™ 2 ANTIMICROBIAL INCISE DRAPE 6650EZ: Brand: IOBAN™ 2

## (undated) DEVICE — 35 ML SYRINGE LUER-LOCK TIP: Brand: MONOJECT

## (undated) DEVICE — SYSTEM SKIN CLSR 22CM DERMBND PRINEO

## (undated) DEVICE — SUTURE ABSORBABLE BRAIDED 2-0 CT-1 27 IN UD VICRYL J259H

## (undated) DEVICE — HOOD, PEEL-AWAY: Brand: FLYTE

## (undated) DEVICE — ZIMMER® STERILE DISPOSABLE TOURNIQUET CUFF WITH PLC, DUAL PORT, SINGLE BLADDER, 34 IN. (86 CM)

## (undated) DEVICE — SPLINT KNEE L20IN FOR 32IN THGH UNIV FOAM 3 PC DSGN TRIMMED

## (undated) DEVICE — LIGHT HANDLE: Brand: DEVON

## (undated) DEVICE — NEEDLE SPNL L3.5IN PNK HUB S STL REG WALL FIT STYL W/ QNCKE

## (undated) DEVICE — PADDING CAST W6INXL4YD NONSTERILE COT RAYON MICROPLEATED

## (undated) DEVICE — GLOVE ORANGE PI 7   MSG9070

## (undated) DEVICE — DRESSING FOAM W4XL12IN SIL RECT ADH WTRPRF FLM BK W/ BORD

## (undated) DEVICE — BOOT POS LEG DEMAYO

## (undated) DEVICE — Z INACTIVE USE 2660664 SOLUTION IRRIG 3000ML 0.9% SOD CHL USP UROMATIC PLAS CONT

## (undated) DEVICE — SUTURE ETHBND EXCEL SZ 2 L30IN NONABSORBABLE GRN L40MM V-37 MX69G

## (undated) DEVICE — COTTON UNDERCAST PADDING,CRIMPED FINISH: Brand: WEBRIL

## (undated) DEVICE — SOLUTION IV IRRIG 500ML 0.9% SODIUM CHL 2F7123

## (undated) DEVICE — DEVICE POS W4INXL5YD KNEE SURG FOAM PD SELF ADH WRP DEMAYO

## (undated) DEVICE — SPONGE LAP W18XL18IN WHT COT 4 PLY FLD STRUNG RADPQ DISP ST

## (undated) DEVICE — 3M™ COBAN™ SELF-ADHERENT WRAP, 1586S, STERILE, 6 IN X 5 YD (15 CM X 4,5 M), 12 ROLLS/CASE: Brand: 3M™ COBAN™

## (undated) DEVICE — NAVIO FLAT MARKERS: Brand: NAVIO

## (undated) DEVICE — BLADE SURG SAW SAG S STL AGG 905MM LEN 185MM W 127MM THCK

## (undated) DEVICE — SUTURE VCRL + SZ 0 L27IN ABSRB UD L36MM CT-1 1/2 CIR VCPP41D

## (undated) DEVICE — COVER,MAYO STAND,STERILE: Brand: MEDLINE